# Patient Record
Sex: FEMALE
[De-identification: names, ages, dates, MRNs, and addresses within clinical notes are randomized per-mention and may not be internally consistent; named-entity substitution may affect disease eponyms.]

---

## 2017-01-11 ENCOUNTER — HOSPITAL (OUTPATIENT)
Dept: OTHER | Age: 67
End: 2017-01-11
Attending: INTERNAL MEDICINE

## 2017-01-23 ENCOUNTER — CHARTING TRANS (OUTPATIENT)
Dept: OTHER | Age: 67
End: 2017-01-23

## 2017-02-16 PROCEDURE — 82784 ASSAY IGA/IGD/IGG/IGM EACH: CPT | Performed by: INTERNAL MEDICINE

## 2017-02-16 PROCEDURE — 82607 VITAMIN B-12: CPT | Performed by: INTERNAL MEDICINE

## 2017-02-20 PROCEDURE — 87086 URINE CULTURE/COLONY COUNT: CPT | Performed by: INTERNAL MEDICINE

## 2017-02-20 PROCEDURE — 81003 URINALYSIS AUTO W/O SCOPE: CPT | Performed by: INTERNAL MEDICINE

## 2017-06-01 PROBLEM — M25.512 CHRONIC LEFT SHOULDER PAIN: Status: ACTIVE | Noted: 2017-06-01

## 2017-06-01 PROBLEM — G89.29 CHRONIC LEFT SHOULDER PAIN: Status: ACTIVE | Noted: 2017-06-01

## 2017-09-10 PROBLEM — E03.9 HYPOTHYROIDISM, UNSPECIFIED TYPE: Status: ACTIVE | Noted: 2017-09-10

## 2017-09-13 PROCEDURE — 87081 CULTURE SCREEN ONLY: CPT | Performed by: INTERNAL MEDICINE

## 2017-09-25 PROCEDURE — 88305 TISSUE EXAM BY PATHOLOGIST: CPT | Performed by: INTERNAL MEDICINE

## 2017-11-18 PROCEDURE — 86803 HEPATITIS C AB TEST: CPT | Performed by: INTERNAL MEDICINE

## 2018-01-04 PROBLEM — D80.1 HYPOGAMMAGLOBULINEMIA (HCC): Status: ACTIVE | Noted: 2018-01-04

## 2018-01-08 ENCOUNTER — APPOINTMENT (OUTPATIENT)
Dept: LAB | Age: 68
End: 2018-01-08
Attending: INTERNAL MEDICINE
Payer: MEDICARE

## 2018-01-08 ENCOUNTER — HOSPITAL ENCOUNTER (OUTPATIENT)
Dept: GENERAL RADIOLOGY | Age: 68
Discharge: HOME | End: 2018-01-08
Attending: INTERNAL MEDICINE
Payer: MEDICARE

## 2018-01-08 ENCOUNTER — LAB ENCOUNTER (OUTPATIENT)
Dept: LAB | Age: 68
End: 2018-01-08
Attending: INTERNAL MEDICINE
Payer: MEDICARE

## 2018-01-08 DIAGNOSIS — Z95.1 HX OF CABG: ICD-10-CM

## 2018-01-08 DIAGNOSIS — I25.10 CORONARY ARTERY DISEASE: ICD-10-CM

## 2018-01-08 LAB
BASOPHILS # BLD AUTO: 0.01 X10(3) UL (ref 0–0.1)
BASOPHILS NFR BLD AUTO: 0.2 %
BUN BLD-MCNC: 15 MG/DL (ref 8–20)
CALCIUM BLD-MCNC: 8.9 MG/DL (ref 8.3–10.3)
CHLORIDE: 104 MMOL/L (ref 101–111)
CO2: 29 MMOL/L (ref 22–32)
CREAT BLD-MCNC: 1.16 MG/DL (ref 0.55–1.02)
EOSINOPHIL # BLD AUTO: 0.13 X10(3) UL (ref 0–0.3)
EOSINOPHIL NFR BLD AUTO: 2.6 %
ERYTHROCYTE [DISTWIDTH] IN BLOOD BY AUTOMATED COUNT: 13.4 % (ref 11.5–16)
GLUCOSE BLD-MCNC: 80 MG/DL (ref 70–99)
HCT VFR BLD AUTO: 42 % (ref 34–50)
HGB BLD-MCNC: 13.5 G/DL (ref 12–16)
IMMATURE GRANULOCYTE COUNT: 0.02 X10(3) UL (ref 0–1)
IMMATURE GRANULOCYTE RATIO %: 0.4 %
LYMPHOCYTES # BLD AUTO: 1.39 X10(3) UL (ref 0.9–4)
LYMPHOCYTES NFR BLD AUTO: 27.3 %
MCH RBC QN AUTO: 28.5 PG (ref 27–33.2)
MCHC RBC AUTO-ENTMCNC: 32.1 G/DL (ref 31–37)
MCV RBC AUTO: 88.8 FL (ref 81–100)
MONOCYTES # BLD AUTO: 0.47 X10(3) UL (ref 0.1–0.6)
MONOCYTES NFR BLD AUTO: 9.2 %
NEUTROPHIL ABS PRELIM: 3.07 X10 (3) UL (ref 1.3–6.7)
NEUTROPHILS # BLD AUTO: 3.07 X10(3) UL (ref 1.3–6.7)
NEUTROPHILS NFR BLD AUTO: 60.3 %
PLATELET # BLD AUTO: 186 10(3)UL (ref 150–450)
POTASSIUM SERPL-SCNC: 3.3 MMOL/L (ref 3.6–5.1)
RBC # BLD AUTO: 4.73 X10(6)UL (ref 3.8–5.1)
RED CELL DISTRIBUTION WIDTH-SD: 43.8 FL (ref 35.1–46.3)
SODIUM SERPL-SCNC: 140 MMOL/L (ref 136–144)
WBC # BLD AUTO: 5.1 X10(3) UL (ref 4–13)

## 2018-01-08 PROCEDURE — 71046 X-RAY EXAM CHEST 2 VIEWS: CPT | Performed by: INTERNAL MEDICINE

## 2018-01-08 PROCEDURE — 93010 ELECTROCARDIOGRAM REPORT: CPT | Performed by: INTERNAL MEDICINE

## 2018-01-08 PROCEDURE — 36415 COLL VENOUS BLD VENIPUNCTURE: CPT

## 2018-01-08 PROCEDURE — 85025 COMPLETE CBC W/AUTO DIFF WBC: CPT

## 2018-01-08 PROCEDURE — 80048 BASIC METABOLIC PNL TOTAL CA: CPT

## 2018-01-08 PROCEDURE — 93005 ELECTROCARDIOGRAM TRACING: CPT

## 2018-01-08 RX ORDER — PAROXETINE HYDROCHLORIDE 20 MG/1
20 TABLET, FILM COATED ORAL EVERY MORNING
COMMUNITY
End: 2018-07-09

## 2018-01-08 RX ORDER — MULTIVITAMIN
1 TABLET ORAL DAILY
COMMUNITY

## 2018-01-08 RX ORDER — GUAIFENESIN 600 MG
600 TABLET, EXTENDED RELEASE 12 HR ORAL 2 TIMES DAILY PRN
COMMUNITY
End: 2020-01-20

## 2018-01-09 LAB
ATRIAL RATE: 60 BPM
P AXIS: 33 DEGREES
P-R INTERVAL: 126 MS
Q-T INTERVAL: 406 MS
QRS DURATION: 78 MS
QTC CALCULATION (BEZET): 406 MS
R AXIS: 20 DEGREES
T AXIS: 68 DEGREES
VENTRICULAR RATE: 60 BPM

## 2018-01-11 ENCOUNTER — HOSPITAL ENCOUNTER (OUTPATIENT)
Dept: INTERVENTIONAL RADIOLOGY/VASCULAR | Facility: HOSPITAL | Age: 68
Discharge: HOME | End: 2018-01-11
Attending: INTERNAL MEDICINE | Admitting: INTERNAL MEDICINE
Payer: MEDICARE

## 2018-01-11 VITALS
HEIGHT: 66 IN | WEIGHT: 190 LBS | DIASTOLIC BLOOD PRESSURE: 51 MMHG | RESPIRATION RATE: 12 BRPM | BODY MASS INDEX: 30.53 KG/M2 | HEART RATE: 55 BPM | SYSTOLIC BLOOD PRESSURE: 119 MMHG | OXYGEN SATURATION: 97 %

## 2018-01-11 DIAGNOSIS — I25.10 CAD (CORONARY ARTERY DISEASE): ICD-10-CM

## 2018-01-11 PROCEDURE — B210YZZ FLUOROSCOPY OF SINGLE CORONARY ARTERY USING OTHER CONTRAST: ICD-10-PCS | Performed by: INTERNAL MEDICINE

## 2018-01-11 PROCEDURE — 93459 L HRT ART/GRFT ANGIO: CPT

## 2018-01-11 PROCEDURE — 99153 MOD SED SAME PHYS/QHP EA: CPT

## 2018-01-11 PROCEDURE — 4A023N7 MEASUREMENT OF CARDIAC SAMPLING AND PRESSURE, LEFT HEART, PERCUTANEOUS APPROACH: ICD-10-PCS | Performed by: INTERNAL MEDICINE

## 2018-01-11 PROCEDURE — B215YZZ FLUOROSCOPY OF LEFT HEART USING OTHER CONTRAST: ICD-10-PCS | Performed by: INTERNAL MEDICINE

## 2018-01-11 PROCEDURE — B212YZZ FLUOROSCOPY OF SINGLE CORONARY ARTERY BYPASS GRAFT USING OTHER CONTRAST: ICD-10-PCS | Performed by: INTERNAL MEDICINE

## 2018-01-11 PROCEDURE — 99152 MOD SED SAME PHYS/QHP 5/>YRS: CPT

## 2018-01-11 RX ORDER — MIDAZOLAM HYDROCHLORIDE 1 MG/ML
INJECTION INTRAMUSCULAR; INTRAVENOUS
Status: COMPLETED
Start: 2018-01-11 | End: 2018-01-11

## 2018-01-11 RX ORDER — HEPARIN SODIUM 5000 [USP'U]/ML
INJECTION, SOLUTION INTRAVENOUS; SUBCUTANEOUS
Status: COMPLETED
Start: 2018-01-11 | End: 2018-01-11

## 2018-01-11 RX ORDER — LIDOCAINE HYDROCHLORIDE 10 MG/ML
INJECTION, SOLUTION EPIDURAL; INFILTRATION; INTRACAUDAL; PERINEURAL
Status: COMPLETED
Start: 2018-01-11 | End: 2018-01-11

## 2018-01-11 RX ORDER — SODIUM CHLORIDE 9 MG/ML
INJECTION, SOLUTION INTRAVENOUS CONTINUOUS
Status: ACTIVE | OUTPATIENT
Start: 2018-01-11 | End: 2018-01-11

## 2018-01-11 RX ORDER — ASPIRIN 81 MG/1
324 TABLET, CHEWABLE ORAL ONCE
Status: DISCONTINUED | OUTPATIENT
Start: 2018-01-11 | End: 2018-01-11

## 2018-01-11 RX ORDER — LABETALOL HYDROCHLORIDE 5 MG/ML
INJECTION, SOLUTION INTRAVENOUS
Status: COMPLETED
Start: 2018-01-11 | End: 2018-01-11

## 2018-01-11 RX ORDER — SODIUM CHLORIDE 9 MG/ML
INJECTION, SOLUTION INTRAVENOUS CONTINUOUS
Status: DISCONTINUED | OUTPATIENT
Start: 2018-01-11 | End: 2018-01-11

## 2018-01-11 RX ADMIN — SODIUM CHLORIDE: 9 INJECTION, SOLUTION INTRAVENOUS at 07:44:00

## 2018-01-11 NOTE — PROCEDURES
[address] [place] [address] Location: Cath Lab    CSN [id number] MRN [id number]   Admission Date 1/11/2018 Procedure Date 1/11/2018   Attending Physician Yonis Godfrey MD Procedure Physician Ar[place]an [place] [id number] motion abnormalities. There was no aortic stenosis upon pullback of the catheter.     2.  Selective coronary angiography:      Left main artery: The left main artery is a small caliber vessel that bifurcates into the LAD and LCx arteries; no significant an

## 2018-01-11 NOTE — PROGRESS NOTES
Pt ambulated in gale, rt groin remains c/d/i and soft. Discharge instructions reviewed w pt and spouse. no questions. IV conner.  Pt discharged to home in stable condition

## 2018-01-11 NOTE — H&P
Patient seen and examined independently. H and P reviewed. No changes in H and P. Risks and benefits of procedure were discussed with patient. Airway examined. Patient is ASA class 2 and mallampati class 2. Pt is appropriate for conscious sedation.  No his

## 2018-02-09 ENCOUNTER — APPOINTMENT (OUTPATIENT)
Dept: GENERAL RADIOLOGY | Facility: HOSPITAL | Age: 68
End: 2018-02-09
Attending: EMERGENCY MEDICINE
Payer: MEDICARE

## 2018-02-09 ENCOUNTER — APPOINTMENT (OUTPATIENT)
Dept: MRI IMAGING | Facility: HOSPITAL | Age: 68
End: 2018-02-09
Attending: EMERGENCY MEDICINE
Payer: MEDICARE

## 2018-02-09 ENCOUNTER — HOSPITAL ENCOUNTER (EMERGENCY)
Facility: HOSPITAL | Age: 68
Discharge: HOME | End: 2018-02-09
Attending: EMERGENCY MEDICINE
Payer: MEDICARE

## 2018-02-09 VITALS
HEIGHT: 66 IN | RESPIRATION RATE: 11 BRPM | BODY MASS INDEX: 30.53 KG/M2 | DIASTOLIC BLOOD PRESSURE: 70 MMHG | SYSTOLIC BLOOD PRESSURE: 146 MMHG | WEIGHT: 190 LBS | HEART RATE: 53 BPM | OXYGEN SATURATION: 98 % | TEMPERATURE: 98 F

## 2018-02-09 DIAGNOSIS — R20.0 NUMBNESS AND TINGLING: Primary | ICD-10-CM

## 2018-02-09 DIAGNOSIS — R20.2 NUMBNESS AND TINGLING: Primary | ICD-10-CM

## 2018-02-09 LAB
ALBUMIN SERPL-MCNC: 4.2 G/DL (ref 3.5–4.8)
ALP LIVER SERPL-CCNC: 90 U/L (ref 55–142)
ALT SERPL-CCNC: 34 U/L (ref 14–54)
AST SERPL-CCNC: 23 U/L (ref 15–41)
ATRIAL RATE: 55 BPM
BASOPHILS # BLD AUTO: 0.01 X10(3) UL (ref 0–0.1)
BASOPHILS NFR BLD AUTO: 0.2 %
BILIRUB SERPL-MCNC: 0.9 MG/DL (ref 0.1–2)
BUN BLD-MCNC: 15 MG/DL (ref 8–20)
CALCIUM BLD-MCNC: 9.3 MG/DL (ref 8.3–10.3)
CHLORIDE: 108 MMOL/L (ref 101–111)
CO2: 27 MMOL/L (ref 22–32)
CREAT BLD-MCNC: 0.85 MG/DL (ref 0.55–1.02)
EOSINOPHIL # BLD AUTO: 0.15 X10(3) UL (ref 0–0.3)
EOSINOPHIL NFR BLD AUTO: 2.9 %
ERYTHROCYTE [DISTWIDTH] IN BLOOD BY AUTOMATED COUNT: 13.7 % (ref 11.5–16)
GLUCOSE BLD-MCNC: 99 MG/DL (ref 70–99)
HCT VFR BLD AUTO: 44.4 % (ref 34–50)
HGB BLD-MCNC: 14.2 G/DL (ref 12–16)
IMMATURE GRANULOCYTE COUNT: 0.01 X10(3) UL (ref 0–1)
IMMATURE GRANULOCYTE RATIO %: 0.2 %
LYMPHOCYTES # BLD AUTO: 1.16 X10(3) UL (ref 0.9–4)
LYMPHOCYTES NFR BLD AUTO: 22.2 %
M PROTEIN MFR SERPL ELPH: 7.6 G/DL (ref 6.1–8.3)
MCH RBC QN AUTO: 28.1 PG (ref 27–33.2)
MCHC RBC AUTO-ENTMCNC: 32 G/DL (ref 31–37)
MCV RBC AUTO: 87.7 FL (ref 81–100)
MONOCYTES # BLD AUTO: 0.44 X10(3) UL (ref 0.1–1)
MONOCYTES NFR BLD AUTO: 8.4 %
NEUTROPHIL ABS PRELIM: 3.45 X10 (3) UL (ref 1.3–6.7)
NEUTROPHILS # BLD AUTO: 3.45 X10(3) UL (ref 1.3–6.7)
NEUTROPHILS NFR BLD AUTO: 66.1 %
P AXIS: 58 DEGREES
P-R INTERVAL: 150 MS
PLATELET # BLD AUTO: 163 10(3)UL (ref 150–450)
POTASSIUM SERPL-SCNC: 3.6 MMOL/L (ref 3.6–5.1)
Q-T INTERVAL: 426 MS
QRS DURATION: 90 MS
QTC CALCULATION (BEZET): 407 MS
R AXIS: 20 DEGREES
RBC # BLD AUTO: 5.06 X10(6)UL (ref 3.8–5.1)
RED CELL DISTRIBUTION WIDTH-SD: 44.1 FL (ref 35.1–46.3)
SODIUM SERPL-SCNC: 141 MMOL/L (ref 136–144)
T AXIS: 68 DEGREES
TROPONIN: <0.046 NG/ML (ref ?–0.05)
VENTRICULAR RATE: 55 BPM
WBC # BLD AUTO: 5.2 X10(3) UL (ref 4–13)

## 2018-02-09 PROCEDURE — 93005 ELECTROCARDIOGRAM TRACING: CPT

## 2018-02-09 PROCEDURE — 80053 COMPREHEN METABOLIC PANEL: CPT | Performed by: EMERGENCY MEDICINE

## 2018-02-09 PROCEDURE — 93010 ELECTROCARDIOGRAM REPORT: CPT

## 2018-02-09 PROCEDURE — 85025 COMPLETE CBC W/AUTO DIFF WBC: CPT | Performed by: EMERGENCY MEDICINE

## 2018-02-09 PROCEDURE — 36415 COLL VENOUS BLD VENIPUNCTURE: CPT

## 2018-02-09 PROCEDURE — 71045 X-RAY EXAM CHEST 1 VIEW: CPT | Performed by: EMERGENCY MEDICINE

## 2018-02-09 PROCEDURE — 99285 EMERGENCY DEPT VISIT HI MDM: CPT

## 2018-02-09 PROCEDURE — 70553 MRI BRAIN STEM W/O & W/DYE: CPT | Performed by: EMERGENCY MEDICINE

## 2018-02-09 PROCEDURE — A9575 INJ GADOTERATE MEGLUMI 0.1ML: HCPCS | Performed by: EMERGENCY MEDICINE

## 2018-02-09 PROCEDURE — 84484 ASSAY OF TROPONIN QUANT: CPT | Performed by: EMERGENCY MEDICINE

## 2018-02-09 RX ORDER — CLONIDINE HYDROCHLORIDE 0.1 MG/1
0.1 TABLET ORAL 2 TIMES DAILY
Status: DISCONTINUED | OUTPATIENT
Start: 2018-02-09 | End: 2018-02-09

## 2018-02-09 NOTE — ED PROVIDER NOTES
Patient Seen in: [facility] Emergency Department    History   Patient presents with:  Numbness Weakness (neurologic)    Stated Complaint: tingling in hand x1 hour    HPI    60-year-old female who presented to the emergency department complaining of 55%       Past Surgical History:  No date: ANGIOGRAM  12/2014 5 V CABG: CABG      Comment: Chris  11/05- recheck in 8-10 yrs Dr. Bronson Mares: COLONOSCOPY      Comment: hyperplastic polyps, diverticulosis, int                hemorrhoids  9/2012: COLONOSCOPY x1 hour  Other systems are as noted in HPI. Constitutional and vital signs reviewed. All other systems reviewed and negative except as noted above.     Physical Exam   ED Triage Vitals [02/09/18 1150]  BP: (!) 191/95  Pulse: 64  Resp: 16  Temp: 98.1 ° -----------         ------                     CBC W/ DIFFERENTIAL[[id number]]                              Final result                 Please view results for these tests on the individual orders.    [address]   R months suggested to assess for stability. Minute chronic microvascular ischemic changes.   X-ray essentially stable appearance of the chest.  EKG was normal.  CBC metabolic panel and troponin were normal.  Her initial blood pressure was 191/95 but it came

## 2018-02-09 NOTE — ED INITIAL ASSESSMENT (HPI)
Pt here for tingling in left hand for past hour. Pt has cardiac hx with angiogram done in Jan. Pt has arthritis/ pain in upper left arm since June and had PT. Pt denies n,v,d. Pt denies light headedness.

## 2018-02-18 PROBLEM — R90.89 ABNORMAL FINDING ON MRI OF BRAIN: Status: ACTIVE | Noted: 2018-02-18

## 2018-04-04 PROCEDURE — 82784 ASSAY IGA/IGD/IGG/IGM EACH: CPT | Performed by: INTERNAL MEDICINE

## 2018-04-04 PROCEDURE — 81003 URINALYSIS AUTO W/O SCOPE: CPT | Performed by: INTERNAL MEDICINE

## 2018-05-31 PROCEDURE — 87480 CANDIDA DNA DIR PROBE: CPT | Performed by: OBSTETRICS & GYNECOLOGY

## 2018-05-31 PROCEDURE — 87660 TRICHOMONAS VAGIN DIR PROBE: CPT | Performed by: OBSTETRICS & GYNECOLOGY

## 2018-05-31 PROCEDURE — 87510 GARDNER VAG DNA DIR PROBE: CPT | Performed by: OBSTETRICS & GYNECOLOGY

## 2018-10-25 PROCEDURE — 82785 ASSAY OF IGE: CPT | Performed by: INTERNAL MEDICINE

## 2018-10-25 PROCEDURE — 82784 ASSAY IGA/IGD/IGG/IGM EACH: CPT | Performed by: INTERNAL MEDICINE

## 2020-10-20 PROBLEM — M18.11 ARTHRITIS OF CARPOMETACARPAL (CMC) JOINT OF RIGHT THUMB: Status: ACTIVE | Noted: 2020-10-20

## 2021-01-05 ENCOUNTER — IMMUNIZATION (OUTPATIENT)
Dept: LAB | Facility: HOSPITAL | Age: 71
End: 2021-01-05
Attending: PREVENTIVE MEDICINE
Payer: MEDICARE

## 2021-01-05 DIAGNOSIS — Z23 NEED FOR VACCINATION: ICD-10-CM

## 2021-01-05 PROCEDURE — 0011A SARSCOV2 VAC 100MCG/0.5ML IM: CPT

## 2021-02-02 ENCOUNTER — IMMUNIZATION (OUTPATIENT)
Dept: LAB | Facility: HOSPITAL | Age: 71
End: 2021-02-02
Attending: PREVENTIVE MEDICINE
Payer: MEDICARE

## 2021-02-02 DIAGNOSIS — Z23 NEED FOR VACCINATION: Primary | ICD-10-CM

## 2021-02-02 PROCEDURE — 0012A SARSCOV2 VAC 100MCG/0.5ML IM: CPT

## 2021-07-27 RX ORDER — AZELASTINE HYDROCHLORIDE 137 UG/1
SPRAY, METERED NASAL DAILY
COMMUNITY
End: 2021-10-25

## 2021-08-03 ENCOUNTER — HOSPITAL ENCOUNTER (OUTPATIENT)
Facility: HOSPITAL | Age: 71
Setting detail: HOSPITAL OUTPATIENT SURGERY
Discharge: HOME | End: 2021-08-03
Attending: INTERNAL MEDICINE | Admitting: INTERNAL MEDICINE
Payer: MEDICARE

## 2021-08-03 ENCOUNTER — ANESTHESIA (OUTPATIENT)
Dept: ENDOSCOPY | Facility: HOSPITAL | Age: 71
End: 2021-08-03
Payer: MEDICARE

## 2021-08-03 ENCOUNTER — ANESTHESIA EVENT (OUTPATIENT)
Dept: ENDOSCOPY | Facility: HOSPITAL | Age: 71
End: 2021-08-03
Payer: MEDICARE

## 2021-08-03 VITALS
OXYGEN SATURATION: 100 % | HEIGHT: 66.5 IN | BODY MASS INDEX: 30.49 KG/M2 | SYSTOLIC BLOOD PRESSURE: 124 MMHG | RESPIRATION RATE: 18 BRPM | WEIGHT: 192 LBS | HEART RATE: 54 BPM | TEMPERATURE: 98 F | DIASTOLIC BLOOD PRESSURE: 66 MMHG

## 2021-08-03 DIAGNOSIS — K31.89 GASTRIC NODULE: ICD-10-CM

## 2021-08-03 PROCEDURE — 0DB38ZX EXCISION OF LOWER ESOPHAGUS, VIA NATURAL OR ARTIFICIAL OPENING ENDOSCOPIC, DIAGNOSTIC: ICD-10-PCS | Performed by: INTERNAL MEDICINE

## 2021-08-03 PROCEDURE — 88305 TISSUE EXAM BY PATHOLOGIST: CPT | Performed by: INTERNAL MEDICINE

## 2021-08-03 RX ORDER — SODIUM CHLORIDE, SODIUM LACTATE, POTASSIUM CHLORIDE, CALCIUM CHLORIDE 600; 310; 30; 20 MG/100ML; MG/100ML; MG/100ML; MG/100ML
INJECTION, SOLUTION INTRAVENOUS CONTINUOUS
Status: DISCONTINUED | OUTPATIENT
Start: 2021-08-03 | End: 2021-08-03

## 2021-08-03 RX ORDER — LIDOCAINE HYDROCHLORIDE 10 MG/ML
INJECTION, SOLUTION EPIDURAL; INFILTRATION; INTRACAUDAL; PERINEURAL AS NEEDED
Status: DISCONTINUED | OUTPATIENT
Start: 2021-08-03 | End: 2021-08-03 | Stop reason: SURG

## 2021-08-03 RX ORDER — ONDANSETRON 2 MG/ML
4 INJECTION INTRAMUSCULAR; INTRAVENOUS AS NEEDED
Status: DISCONTINUED | OUTPATIENT
Start: 2021-08-03 | End: 2021-08-03

## 2021-08-03 RX ORDER — METOCLOPRAMIDE HYDROCHLORIDE 5 MG/ML
10 INJECTION INTRAMUSCULAR; INTRAVENOUS AS NEEDED
Status: DISCONTINUED | OUTPATIENT
Start: 2021-08-03 | End: 2021-08-03

## 2021-08-03 RX ORDER — NALOXONE HYDROCHLORIDE 0.4 MG/ML
80 INJECTION, SOLUTION INTRAMUSCULAR; INTRAVENOUS; SUBCUTANEOUS AS NEEDED
Status: DISCONTINUED | OUTPATIENT
Start: 2021-08-03 | End: 2021-08-03

## 2021-08-03 RX ADMIN — SODIUM CHLORIDE, SODIUM LACTATE, POTASSIUM CHLORIDE, CALCIUM CHLORIDE: 600; 310; 30; 20 INJECTION, SOLUTION INTRAVENOUS at 09:44:00

## 2021-08-03 RX ADMIN — SODIUM CHLORIDE, SODIUM LACTATE, POTASSIUM CHLORIDE, CALCIUM CHLORIDE: 600; 310; 30; 20 INJECTION, SOLUTION INTRAVENOUS at 09:27:00

## 2021-08-03 RX ADMIN — LIDOCAINE HYDROCHLORIDE 80 MG: 10 INJECTION, SOLUTION EPIDURAL; INFILTRATION; INTRACAUDAL; PERINEURAL at 09:35:00

## 2021-08-03 NOTE — ANESTHESIA PREPROCEDURE EVALUATION
PRE-OP EVALUATION    Patient Name: Delfina Waite    Admit Diagnosis: Gastric nodule [K31.89]    Pre-op Diagnosis: Gastric nodule [K31.89]    ESOPHAGOGASTRODUODENOSCOPY  WITH ENDOSCOPIC MUCOSAL RESECTION    Anesthesia Procedure: Fei Smallwood (two) times daily. , Disp: 90 tablet, Rfl: 3, 8/3/2021 at Unknown time  Sodium Chloride-Xylitol (XLEAR SINUS CARE SPRAY NA), by Nasal route 2 (two) times daily. , Disp: , Rfl: , 8/2/2021 at Unknown time  Albuterol Sulfate HFA (PROAIR HFA) 108 (90 Base) MCG/A diverticulosis   • COLONOSCOPY,BIOPSY  9/11/2012    Procedure: ESOPHAGOGASTRODUODENOSCOPY, COLONOSCOPY, POSSIBLE BIOPSY, POSSIBLE POLYPECTOMY 47356,[id number];  Surgeon: Leila Ortiz MD;  Location: [address] [place] [address]   • D & C     • EGD N/A 9/25/201 3   Plan: MAC  NPO status verified and patient meets guidelines. Comment: Plan for MAC with GA back up. Risks include but limited to awareness, oral and dental injury, nausea/vomiting, anaphylaxis, prolonged ventilation and myocardial infarction.  Al

## 2021-08-03 NOTE — H&P
History & Physical Examination    Patient Name: Priscilla Piper  MRN: [id number]  CSN: [id number]  YOB: 1950    Diagnosis: Gastric nodule [K31.89]      Present Illness:  Priscilla Piper is a 69year old female is here Gastric nodul omnicef  Dander                  HIVES, Runny nose, ITCHING    Comment:Cats & dogs  Mold                    WHEEZING    Past Surgical History:   Procedure Laterality Date   • ANGIOGRAM     • CABG  12/2014 5 V CABG    [place]   • CARPAL TUNNEL RELEASE Rebecca fatal MI at 80   • Psychiatric Father         dementia   • Lipids Mother    • Heart Disease Mother         CVA at age 54   • Mental Disorder Mother         dementia   • Other (Other) Mother         stroke   • Heart Disorder Brother 58        stent/MI age 10

## 2021-08-03 NOTE — OPERATIVE REPORT
OPERATIVE REPORT   PATIENT NAME: Lyssa Rick  MRN: [id number]  DATE OF OPERATION: 8/3/2021  PREOPERATIVE DIAGNOSIS: gastric hyperplastic nodule  POSTOPERATIVE DIAGNOSIS:    1. Regressed gastric nodule after PPI    2. Small hiatal hermia   3.   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 Patient to resume Plavix tomorrow.     DISCHARGE:  On discharge, the patient was given an after-visit summary detailing the procedure, findings, followup plans, and an updated medication list.

## 2021-08-03 NOTE — ANESTHESIA POSTPROCEDURE EVALUATION
[address] Patient Status:  Hospital Outpatient Surgery   Age/Gender 69year old female MRN [id number]   Location [id number] [place] [address] Attending Celso Waters MD   Hosp Day # 0 PCP Alessandra Andrea MD

## 2021-10-11 ENCOUNTER — TELEPHONE (OUTPATIENT)
Dept: INTERNAL MEDICINE CLINIC | Facility: HOSPITAL | Age: 71
End: 2021-10-11

## 2021-10-11 NOTE — TELEPHONE ENCOUNTER
Outside Covid Testing done    Results and RTW guidelines:    COVID RESULT reported:      Test type:    [x] Rapid outside         [] PCR outside    Date of test: 10/2/21    Test location: [facility]     [x] Result viewed in Epic with verbal consent received from e loss of taste and smell. Has allergies to ragweed and experiencing Sx related. Went to [facility] per pt and quarantined at home with . Is calling to return to volunteering . Information given to call Employee [employer] for RTW clearance.     RTW PLAN:    [x] • Shortness of breath  Yes [x]      • Congestion                 Yes [x]      • Runny nose                Yes [x]        • Loss of Smell              Yes [x]       •  Loss of Taste             Yes [x]       • Sore throat                 Yes []       • Fa

## 2021-10-22 ENCOUNTER — APPOINTMENT (OUTPATIENT)
Dept: GENERAL RADIOLOGY | Facility: HOSPITAL | Age: 71
End: 2021-10-22
Attending: EMERGENCY MEDICINE
Payer: MEDICARE

## 2021-10-22 ENCOUNTER — APPOINTMENT (OUTPATIENT)
Dept: CT IMAGING | Facility: HOSPITAL | Age: 71
End: 2021-10-22
Attending: EMERGENCY MEDICINE
Payer: MEDICARE

## 2021-10-22 ENCOUNTER — HOSPITAL ENCOUNTER (EMERGENCY)
Facility: HOSPITAL | Age: 71
Discharge: HOME | End: 2021-10-23
Attending: EMERGENCY MEDICINE
Payer: MEDICARE

## 2021-10-22 VITALS
TEMPERATURE: 97 F | DIASTOLIC BLOOD PRESSURE: 70 MMHG | SYSTOLIC BLOOD PRESSURE: 139 MMHG | OXYGEN SATURATION: 98 % | HEART RATE: 50 BPM | RESPIRATION RATE: 12 BRPM

## 2021-10-22 DIAGNOSIS — J98.01 ACUTE BRONCHOSPASM: ICD-10-CM

## 2021-10-22 DIAGNOSIS — J20.9 ACUTE BRONCHITIS, UNSPECIFIED ORGANISM: Primary | ICD-10-CM

## 2021-10-22 PROCEDURE — 80053 COMPREHEN METABOLIC PANEL: CPT | Performed by: EMERGENCY MEDICINE

## 2021-10-22 PROCEDURE — 93010 ELECTROCARDIOGRAM REPORT: CPT

## 2021-10-22 PROCEDURE — 85025 COMPLETE CBC W/AUTO DIFF WBC: CPT | Performed by: EMERGENCY MEDICINE

## 2021-10-22 PROCEDURE — 99285 EMERGENCY DEPT VISIT HI MDM: CPT

## 2021-10-22 PROCEDURE — 85730 THROMBOPLASTIN TIME PARTIAL: CPT | Performed by: EMERGENCY MEDICINE

## 2021-10-22 PROCEDURE — 85379 FIBRIN DEGRADATION QUANT: CPT | Performed by: EMERGENCY MEDICINE

## 2021-10-22 PROCEDURE — 84484 ASSAY OF TROPONIN QUANT: CPT | Performed by: EMERGENCY MEDICINE

## 2021-10-22 PROCEDURE — 36415 COLL VENOUS BLD VENIPUNCTURE: CPT

## 2021-10-22 PROCEDURE — 85610 PROTHROMBIN TIME: CPT | Performed by: EMERGENCY MEDICINE

## 2021-10-22 PROCEDURE — 71045 X-RAY EXAM CHEST 1 VIEW: CPT | Performed by: EMERGENCY MEDICINE

## 2021-10-22 PROCEDURE — 99284 EMERGENCY DEPT VISIT MOD MDM: CPT

## 2021-10-22 PROCEDURE — 93005 ELECTROCARDIOGRAM TRACING: CPT

## 2021-10-22 PROCEDURE — 71275 CT ANGIOGRAPHY CHEST: CPT | Performed by: EMERGENCY MEDICINE

## 2021-10-23 NOTE — ED INITIAL ASSESSMENT (HPI)
COVID diagnosis 10/2. Reports for the last 48 hours when she breathes deeply she feels heaviness in her chest.  Sent to  by her pulmonologist for a CXR, which they couldn't do.  sent her here for imaging and bloodwork.

## 2021-10-23 NOTE — ED PROVIDER NOTES
Patient Seen in: [facility] Emergency Department      History   Patient presents with:  Chest Pain    Stated Complaint: chest pain from urgent care     Subjective:   HPI    65-year-old female presents to the emerge department for further evaluation Sleep apnea    • Unstable angina ([facility] [address] 07/21/2016    s/p DOTTY to SVG OM.  EF 55%   • Visual impairment     glasses              Past Surgical History:   Procedure Laterality Date   • ANGIOGRAM     • CABG  12/2014 5 V CABG    [place]   • CARPAL TUNNEL RELEAS are negative. Positive for stated complaint: chest pain from urgent care   Other systems are as noted in HPI. Constitutional and vital signs reviewed. All other systems reviewed and negative except as noted above.     Physical Exam     ED Triage - Abnormal; Notable for the following components:    D-Dimer 0.81 (*)     All other components within normal limits   TROPONIN I - Normal   PROTHROMBIN TIME (PT) - Normal   PTT, ACTIVATED - Normal   CBC WITH DIFFERENTIAL WITH PLATELET    Narrative:      The transmitted to the [facility] [employer] [facility]iology) Evon Reyna [address] ([address]) which includes the Dose Index Registry.   PATIENT STATED HISTORY:(As transcribed by Technologist)  chest pain from urgent care  recent COVID diagnosis   CONTRAST atelectasis or scarring seen at the left lung base which is stable. The lungs are hyperinflated. Old healed right rib fracture noted involving the posterolateral right 8th rib. CONCLUSION:  Status post CABG.   The caudal most sternotomy wire is bronchospasm     Disposition:  Discharge  10/22/2021 11:41 pm    Follow-up:  Dafne Hou MD  [facility]  [phone]    Schedule an appointment as soon as possible for a visit            Medications Prescribed

## 2021-11-12 PROBLEM — M25.512 CHRONIC LEFT SHOULDER PAIN: Status: RESOLVED | Noted: 2017-06-01 | Resolved: 2021-11-12

## 2021-11-12 PROBLEM — G89.29 CHRONIC LEFT SHOULDER PAIN: Status: RESOLVED | Noted: 2017-06-01 | Resolved: 2021-11-12

## 2021-11-12 PROBLEM — M18.11 ARTHRITIS OF CARPOMETACARPAL (CMC) JOINT OF RIGHT THUMB: Status: RESOLVED | Noted: 2020-10-20 | Resolved: 2021-11-12

## 2021-11-15 PROBLEM — D80.1 HYPOGAMMAGLOBULINEMIA (HCC): Status: RESOLVED | Noted: 2018-01-04 | Resolved: 2021-11-15

## 2021-11-18 ENCOUNTER — HOSPITAL ENCOUNTER (EMERGENCY)
Facility: HOSPITAL | Age: 71
Discharge: HOME | End: 2021-11-19
Attending: EMERGENCY MEDICINE
Payer: MEDICARE

## 2021-11-18 ENCOUNTER — APPOINTMENT (OUTPATIENT)
Dept: GENERAL RADIOLOGY | Facility: HOSPITAL | Age: 71
End: 2021-11-18
Attending: EMERGENCY MEDICINE
Payer: MEDICARE

## 2021-11-18 DIAGNOSIS — N39.0 URINARY TRACT INFECTION WITHOUT HEMATURIA, SITE UNSPECIFIED: Primary | ICD-10-CM

## 2021-11-18 DIAGNOSIS — I10 HYPERTENSION, UNSPECIFIED TYPE: ICD-10-CM

## 2021-11-18 PROCEDURE — 36415 COLL VENOUS BLD VENIPUNCTURE: CPT

## 2021-11-18 PROCEDURE — 93005 ELECTROCARDIOGRAM TRACING: CPT

## 2021-11-18 PROCEDURE — 71045 X-RAY EXAM CHEST 1 VIEW: CPT | Performed by: EMERGENCY MEDICINE

## 2021-11-18 PROCEDURE — 93010 ELECTROCARDIOGRAM REPORT: CPT

## 2021-11-18 PROCEDURE — 99284 EMERGENCY DEPT VISIT MOD MDM: CPT

## 2021-11-19 VITALS
DIASTOLIC BLOOD PRESSURE: 58 MMHG | WEIGHT: 191.81 LBS | HEART RATE: 55 BPM | OXYGEN SATURATION: 98 % | RESPIRATION RATE: 13 BRPM | TEMPERATURE: 98 F | SYSTOLIC BLOOD PRESSURE: 138 MMHG | BODY MASS INDEX: 30 KG/M2

## 2021-11-19 PROCEDURE — 87086 URINE CULTURE/COLONY COUNT: CPT | Performed by: EMERGENCY MEDICINE

## 2021-11-19 PROCEDURE — 81001 URINALYSIS AUTO W/SCOPE: CPT | Performed by: EMERGENCY MEDICINE

## 2021-11-19 PROCEDURE — 84484 ASSAY OF TROPONIN QUANT: CPT | Performed by: EMERGENCY MEDICINE

## 2021-11-19 PROCEDURE — 85379 FIBRIN DEGRADATION QUANT: CPT | Performed by: EMERGENCY MEDICINE

## 2021-11-19 PROCEDURE — 85025 COMPLETE CBC W/AUTO DIFF WBC: CPT | Performed by: EMERGENCY MEDICINE

## 2021-11-19 PROCEDURE — 80053 COMPREHEN METABOLIC PANEL: CPT | Performed by: EMERGENCY MEDICINE

## 2021-11-19 RX ORDER — CEPHALEXIN 500 MG/1
500 CAPSULE ORAL 3 TIMES DAILY
Qty: 15 CAPSULE | Refills: 0 | Status: SHIPPED | OUTPATIENT
Start: 2021-11-19 | End: 2021-11-24

## 2021-11-19 NOTE — ED INITIAL ASSESSMENT (HPI)
Pt arrives with c/o GENESIS since this evening. sats 98 on RA, speaking in full sentences. No obvious distress.

## 2021-11-19 NOTE — ED PROVIDER NOTES
Patient Seen in: [facility] Emergency Department      History   Patient presents with:  Hypertension    Stated Complaint: high BP.     Subjective:   HPI    Patient with a history of 5 vessel CABG GERD without esophagitis asthma IBS and unfortunately • Gastroesophageal reflux disease without esophagitis    • High blood pressure    • Hypogammaglobulinemia (HCC)    • Hypothyroidism, unspecified type    • IBS (irritable bowel syndrome)    • Mixed hyperlipidemia    • EMELYN on CPAP 2012   • Osteopenia Nose: Nose normal.   Eyes:      General: No scleral icterus. Right eye: No discharge. Left eye: No discharge. Conjunctiva/sclera: Conjunctivae normal.      Pupils: Pupils are equal, round, and reactive to light.    Neck:      Vascular: Normal   TROPONIN I - Normal   CBC WITH DIFFERENTIAL WITH PLATELET    Narrative: The following orders were created for panel order CBC With Differential With Platelet.   Procedure                               Abnormality         Status woman presents to the emergency department she states that she got nauseous and had upper abdominal discomfort after eating a meal #35 and then having blueberry pie on her anniversary.   She has not been eating or drinking much recently because she has rece

## 2021-12-23 NOTE — Clinical Note
Eileen - I saw Ariadne today with vulvar skin complaints. I've recommended vulvar clobetasol & vag estrogen. I will work to manage her sx. I appreciate the opportunity to participate in her care. Thanks, Hortensia   RRT called for persistent tachycardia  Pt seen and examined at bedside. ECG w/  Rapid afib to 140s and elevated BPs to 582I systolic. S/P LHC which showed no vessel occlusion. Pt is adequately anticoagulated on eliquis. Plan for 20mg dilt bolus, 500cc NS Bolus. Repeat electrolytes and replete as needed.     [address]

## 2022-01-06 ENCOUNTER — IMMUNIZATION (OUTPATIENT)
Dept: LAB | Facility: HOSPITAL | Age: 72
End: 2022-01-06
Attending: EMERGENCY MEDICINE
Payer: MEDICARE

## 2022-01-06 DIAGNOSIS — Z23 NEED FOR VACCINATION: Primary | ICD-10-CM

## 2022-01-06 PROCEDURE — 0064A SARSCOV2 VAC 50MCG/0.25ML IM: CPT

## 2022-05-24 ENCOUNTER — IMMUNIZATION (OUTPATIENT)
Dept: LAB | Age: 72
End: 2022-05-24
Attending: EMERGENCY MEDICINE
Payer: MEDICARE

## 2022-05-24 DIAGNOSIS — Z23 NEED FOR VACCINATION: Primary | ICD-10-CM

## 2022-05-24 PROCEDURE — 0064A SARSCOV2 VAC 50MCG/0.25ML IM: CPT

## 2023-04-25 RX ORDER — LEVOCETIRIZINE DIHYDROCHLORIDE 5 MG/1
5 TABLET, FILM COATED ORAL EVERY EVENING
COMMUNITY

## 2023-04-25 RX ORDER — ATORVASTATIN CALCIUM 40 MG/1
40 TABLET, FILM COATED ORAL NIGHTLY
COMMUNITY

## 2023-04-25 RX ORDER — FLUTICASONE PROPIONATE 50 MCG
1 SPRAY, SUSPENSION (ML) NASAL DAILY PRN
COMMUNITY

## 2023-04-25 RX ORDER — CHLORPHENIRAMINE MALEATE 4 MG/1
4 TABLET ORAL EVERY 4 HOURS PRN
COMMUNITY

## 2023-04-25 RX ORDER — LOSARTAN POTASSIUM 50 MG/1
50 TABLET ORAL DAILY
COMMUNITY

## 2023-04-25 RX ORDER — CLOPIDOGREL BISULFATE 75 MG/1
75 TABLET ORAL DAILY
Status: ON HOLD | COMMUNITY
End: 2023-05-11

## 2023-04-25 RX ORDER — GARLIC EXTRACT 500 MG
1 CAPSULE ORAL DAILY
COMMUNITY

## 2023-05-01 ENCOUNTER — LABORATORY ENCOUNTER (OUTPATIENT)
Dept: LAB | Facility: HOSPITAL | Age: 73
End: 2023-05-01
Attending: SURGERY
Payer: MEDICARE

## 2023-05-01 ENCOUNTER — EKG ENCOUNTER (OUTPATIENT)
Dept: LAB | Facility: HOSPITAL | Age: 73
End: 2023-05-01
Attending: SURGERY
Payer: MEDICARE

## 2023-05-01 DIAGNOSIS — K21.9 GASTROESOPHAGEAL REFLUX DISEASE WITHOUT ESOPHAGITIS: ICD-10-CM

## 2023-05-01 LAB
ANION GAP SERPL CALC-SCNC: 2 MMOL/L (ref 0–18)
ATRIAL RATE: 46 BPM
BUN BLD-MCNC: 14 MG/DL (ref 7–18)
CALCIUM BLD-MCNC: 9.2 MG/DL (ref 8.5–10.1)
CHLORIDE SERPL-SCNC: 110 MMOL/L (ref 98–112)
CO2 SERPL-SCNC: 28 MMOL/L (ref 21–32)
CREAT BLD-MCNC: 0.84 MG/DL
FASTING STATUS PATIENT QL REPORTED: YES
GFR SERPLBLD BASED ON 1.73 SQ M-ARVRAT: 73 ML/MIN/1.73M2 (ref 60–?)
GLUCOSE BLD-MCNC: 99 MG/DL (ref 70–99)
OSMOLALITY SERPL CALC.SUM OF ELEC: 291 MOSM/KG (ref 275–295)
P AXIS: 50 DEGREES
P-R INTERVAL: 164 MS
POTASSIUM SERPL-SCNC: 3.9 MMOL/L (ref 3.5–5.1)
Q-T INTERVAL: 460 MS
QRS DURATION: 88 MS
QTC CALCULATION (BEZET): 402 MS
R AXIS: 11 DEGREES
SODIUM SERPL-SCNC: 140 MMOL/L (ref 136–145)
T AXIS: 47 DEGREES
VENTRICULAR RATE: 46 BPM

## 2023-05-01 PROCEDURE — 80048 BASIC METABOLIC PNL TOTAL CA: CPT

## 2023-05-01 PROCEDURE — 36415 COLL VENOUS BLD VENIPUNCTURE: CPT

## 2023-05-01 PROCEDURE — 93010 ELECTROCARDIOGRAM REPORT: CPT | Performed by: INTERNAL MEDICINE

## 2023-05-01 PROCEDURE — 93005 ELECTROCARDIOGRAM TRACING: CPT

## 2023-05-10 ENCOUNTER — HOSPITAL ENCOUNTER (OUTPATIENT)
Facility: HOSPITAL | Age: 73
Discharge: HOME | End: 2023-05-11
Attending: SURGERY | Admitting: SURGERY
Payer: MEDICARE

## 2023-05-10 ENCOUNTER — ANESTHESIA (OUTPATIENT)
Dept: SURGERY | Facility: HOSPITAL | Age: 73
End: 2023-05-10
Payer: MEDICARE

## 2023-05-10 ENCOUNTER — ANESTHESIA EVENT (OUTPATIENT)
Dept: SURGERY | Facility: HOSPITAL | Age: 73
End: 2023-05-10
Payer: MEDICARE

## 2023-05-10 DIAGNOSIS — K21.9 GASTROESOPHAGEAL REFLUX DISEASE WITHOUT ESOPHAGITIS: Primary | ICD-10-CM

## 2023-05-10 PROCEDURE — 94660 CPAP INITIATION&MGMT: CPT

## 2023-05-10 PROCEDURE — 8E0W4CZ ROBOTIC ASSISTED PROCEDURE OF TRUNK REGION, PERCUTANEOUS ENDOSCOPIC APPROACH: ICD-10-PCS | Performed by: SURGERY

## 2023-05-10 PROCEDURE — 94799 UNLISTED PULMONARY SVC/PX: CPT

## 2023-05-10 PROCEDURE — 0BQT3ZZ REPAIR DIAPHRAGM, PERCUTANEOUS APPROACH: ICD-10-PCS | Performed by: SURGERY

## 2023-05-10 RX ORDER — DEXAMETHASONE SODIUM PHOSPHATE 4 MG/ML
VIAL (ML) INJECTION AS NEEDED
Status: DISCONTINUED | OUTPATIENT
Start: 2023-05-10 | End: 2023-05-10 | Stop reason: SURG

## 2023-05-10 RX ORDER — CLINDAMYCIN PHOSPHATE 900 MG/50ML
900 INJECTION INTRAVENOUS EVERY 8 HOURS
Status: COMPLETED | OUTPATIENT
Start: 2023-05-10 | End: 2023-05-11

## 2023-05-10 RX ORDER — IPRATROPIUM BROMIDE 42 UG/1
1-2 SPRAY, METERED NASAL DAILY PRN
COMMUNITY
Start: 2022-08-26

## 2023-05-10 RX ORDER — ACETAMINOPHEN 500 MG
1000 TABLET ORAL ONCE AS NEEDED
Status: DISCONTINUED | OUTPATIENT
Start: 2023-05-10 | End: 2023-05-10 | Stop reason: HOSPADM

## 2023-05-10 RX ORDER — EPHEDRINE SULFATE 50 MG/ML
INJECTION INTRAVENOUS AS NEEDED
Status: DISCONTINUED | OUTPATIENT
Start: 2023-05-10 | End: 2023-05-10 | Stop reason: SURG

## 2023-05-10 RX ORDER — KETOROLAC TROMETHAMINE 30 MG/ML
INJECTION, SOLUTION INTRAMUSCULAR; INTRAVENOUS AS NEEDED
Status: DISCONTINUED | OUTPATIENT
Start: 2023-05-10 | End: 2023-05-10 | Stop reason: SURG

## 2023-05-10 RX ORDER — ALBUTEROL SULFATE 90 UG/1
2 AEROSOL, METERED RESPIRATORY (INHALATION) EVERY 4 HOURS PRN
Status: DISCONTINUED | OUTPATIENT
Start: 2023-05-10 | End: 2023-05-11

## 2023-05-10 RX ORDER — SODIUM CHLORIDE, SODIUM LACTATE, POTASSIUM CHLORIDE, CALCIUM CHLORIDE 600; 310; 30; 20 MG/100ML; MG/100ML; MG/100ML; MG/100ML
INJECTION, SOLUTION INTRAVENOUS CONTINUOUS
Status: DISCONTINUED | OUTPATIENT
Start: 2023-05-10 | End: 2023-05-11

## 2023-05-10 RX ORDER — NALOXONE HYDROCHLORIDE 0.4 MG/ML
80 INJECTION, SOLUTION INTRAMUSCULAR; INTRAVENOUS; SUBCUTANEOUS AS NEEDED
Status: DISCONTINUED | OUTPATIENT
Start: 2023-05-10 | End: 2023-05-10 | Stop reason: HOSPADM

## 2023-05-10 RX ORDER — ACETAMINOPHEN 500 MG
1000 TABLET ORAL ONCE
Status: DISCONTINUED | OUTPATIENT
Start: 2023-05-10 | End: 2023-05-10 | Stop reason: HOSPADM

## 2023-05-10 RX ORDER — HYDROMORPHONE HYDROCHLORIDE 1 MG/ML
0.2 INJECTION, SOLUTION INTRAMUSCULAR; INTRAVENOUS; SUBCUTANEOUS EVERY 5 MIN PRN
Status: DISCONTINUED | OUTPATIENT
Start: 2023-05-10 | End: 2023-05-10 | Stop reason: HOSPADM

## 2023-05-10 RX ORDER — SENNOSIDES 8.6 MG
17.2 TABLET ORAL NIGHTLY PRN
Status: DISCONTINUED | OUTPATIENT
Start: 2023-05-10 | End: 2023-05-11

## 2023-05-10 RX ORDER — POLYETHYLENE GLYCOL 3350 17 G/17G
17 POWDER, FOR SOLUTION ORAL DAILY PRN
Status: DISCONTINUED | OUTPATIENT
Start: 2023-05-10 | End: 2023-05-11

## 2023-05-10 RX ORDER — ONDANSETRON 2 MG/ML
4 INJECTION INTRAMUSCULAR; INTRAVENOUS EVERY 6 HOURS PRN
Status: DISCONTINUED | OUTPATIENT
Start: 2023-05-10 | End: 2023-05-10 | Stop reason: HOSPADM

## 2023-05-10 RX ORDER — METOCLOPRAMIDE HYDROCHLORIDE 5 MG/ML
10 INJECTION INTRAMUSCULAR; INTRAVENOUS EVERY 8 HOURS PRN
Status: DISCONTINUED | OUTPATIENT
Start: 2023-05-10 | End: 2023-05-11

## 2023-05-10 RX ORDER — ENEMA 19; 7 G/133ML; G/133ML
1 ENEMA RECTAL ONCE AS NEEDED
Status: DISCONTINUED | OUTPATIENT
Start: 2023-05-10 | End: 2023-05-11

## 2023-05-10 RX ORDER — KETOROLAC TROMETHAMINE 15 MG/ML
15 INJECTION, SOLUTION INTRAMUSCULAR; INTRAVENOUS EVERY 6 HOURS
Status: DISCONTINUED | OUTPATIENT
Start: 2023-05-10 | End: 2023-05-11

## 2023-05-10 RX ORDER — HYDROMORPHONE HYDROCHLORIDE 1 MG/ML
INJECTION, SOLUTION INTRAMUSCULAR; INTRAVENOUS; SUBCUTANEOUS
Status: COMPLETED
Start: 2023-05-10 | End: 2023-05-10

## 2023-05-10 RX ORDER — LIDOCAINE HYDROCHLORIDE 10 MG/ML
INJECTION, SOLUTION INFILTRATION; PERINEURAL AS NEEDED
Status: DISCONTINUED | OUTPATIENT
Start: 2023-05-10 | End: 2023-05-10 | Stop reason: HOSPADM

## 2023-05-10 RX ORDER — DEXTROSE, SODIUM CHLORIDE, AND POTASSIUM CHLORIDE 5; .45; .15 G/100ML; G/100ML; G/100ML
INJECTION INTRAVENOUS
Status: COMPLETED
Start: 2023-05-10 | End: 2023-05-10

## 2023-05-10 RX ORDER — BUPIVACAINE HYDROCHLORIDE 5 MG/ML
INJECTION, SOLUTION EPIDURAL; INTRACAUDAL AS NEEDED
Status: DISCONTINUED | OUTPATIENT
Start: 2023-05-10 | End: 2023-05-10 | Stop reason: HOSPADM

## 2023-05-10 RX ORDER — TRAMADOL HYDROCHLORIDE 50 MG/1
50 TABLET ORAL EVERY 6 HOURS SCHEDULED
Status: DISCONTINUED | OUTPATIENT
Start: 2023-05-10 | End: 2023-05-11

## 2023-05-10 RX ORDER — CLINDAMYCIN PHOSPHATE 900 MG/50ML
900 INJECTION INTRAVENOUS ONCE
Status: COMPLETED | OUTPATIENT
Start: 2023-05-10 | End: 2023-05-10

## 2023-05-10 RX ORDER — ROCURONIUM BROMIDE 10 MG/ML
INJECTION, SOLUTION INTRAVENOUS AS NEEDED
Status: DISCONTINUED | OUTPATIENT
Start: 2023-05-10 | End: 2023-05-10 | Stop reason: SURG

## 2023-05-10 RX ORDER — PANTOPRAZOLE SODIUM 20 MG/1
20 TABLET, DELAYED RELEASE ORAL
Status: DISCONTINUED | OUTPATIENT
Start: 2023-05-11 | End: 2023-05-11

## 2023-05-10 RX ORDER — DEXTROSE, SODIUM CHLORIDE, AND POTASSIUM CHLORIDE 5; .45; .15 G/100ML; G/100ML; G/100ML
INJECTION INTRAVENOUS CONTINUOUS
Status: DISCONTINUED | OUTPATIENT
Start: 2023-05-10 | End: 2023-05-11

## 2023-05-10 RX ORDER — ONDANSETRON 2 MG/ML
4 INJECTION INTRAMUSCULAR; INTRAVENOUS EVERY 6 HOURS PRN
Status: DISCONTINUED | OUTPATIENT
Start: 2023-05-10 | End: 2023-05-11

## 2023-05-10 RX ORDER — HYDROCODONE BITARTRATE AND ACETAMINOPHEN 5; 325 MG/1; MG/1
1 TABLET ORAL ONCE AS NEEDED
Status: DISCONTINUED | OUTPATIENT
Start: 2023-05-10 | End: 2023-05-10 | Stop reason: HOSPADM

## 2023-05-10 RX ORDER — METOCLOPRAMIDE HYDROCHLORIDE 5 MG/ML
10 INJECTION INTRAMUSCULAR; INTRAVENOUS EVERY 8 HOURS PRN
Status: DISCONTINUED | OUTPATIENT
Start: 2023-05-10 | End: 2023-05-10 | Stop reason: HOSPADM

## 2023-05-10 RX ORDER — HYDROMORPHONE HYDROCHLORIDE 1 MG/ML
0.6 INJECTION, SOLUTION INTRAMUSCULAR; INTRAVENOUS; SUBCUTANEOUS EVERY 5 MIN PRN
Status: DISCONTINUED | OUTPATIENT
Start: 2023-05-10 | End: 2023-05-10 | Stop reason: HOSPADM

## 2023-05-10 RX ORDER — LEVOCETIRIZINE DIHYDROCHLORIDE 5 MG/1
5 TABLET, FILM COATED ORAL EVERY EVENING
Status: ON HOLD | COMMUNITY
End: 2023-05-10

## 2023-05-10 RX ORDER — LIDOCAINE HYDROCHLORIDE 10 MG/ML
INJECTION, SOLUTION EPIDURAL; INFILTRATION; INTRACAUDAL; PERINEURAL AS NEEDED
Status: DISCONTINUED | OUTPATIENT
Start: 2023-05-10 | End: 2023-05-10 | Stop reason: SURG

## 2023-05-10 RX ORDER — BISACODYL 10 MG
10 SUPPOSITORY, RECTAL RECTAL
Status: DISCONTINUED | OUTPATIENT
Start: 2023-05-10 | End: 2023-05-11

## 2023-05-10 RX ORDER — LEVOTHYROXINE SODIUM 0.07 MG/1
75 TABLET ORAL
Status: DISCONTINUED | OUTPATIENT
Start: 2023-05-11 | End: 2023-05-11

## 2023-05-10 RX ORDER — PAROXETINE HYDROCHLORIDE 20 MG/1
20 TABLET, FILM COATED ORAL DAILY
Status: DISCONTINUED | OUTPATIENT
Start: 2023-05-11 | End: 2023-05-11

## 2023-05-10 RX ORDER — SODIUM CHLORIDE, SODIUM LACTATE, POTASSIUM CHLORIDE, CALCIUM CHLORIDE 600; 310; 30; 20 MG/100ML; MG/100ML; MG/100ML; MG/100ML
INJECTION, SOLUTION INTRAVENOUS CONTINUOUS
Status: DISCONTINUED | OUTPATIENT
Start: 2023-05-10 | End: 2023-05-10 | Stop reason: HOSPADM

## 2023-05-10 RX ORDER — LOSARTAN POTASSIUM 50 MG/1
50 TABLET ORAL DAILY
Status: DISCONTINUED | OUTPATIENT
Start: 2023-05-11 | End: 2023-05-11

## 2023-05-10 RX ORDER — HYDROMORPHONE HYDROCHLORIDE 1 MG/ML
0.4 INJECTION, SOLUTION INTRAMUSCULAR; INTRAVENOUS; SUBCUTANEOUS EVERY 5 MIN PRN
Status: DISCONTINUED | OUTPATIENT
Start: 2023-05-10 | End: 2023-05-10 | Stop reason: HOSPADM

## 2023-05-10 RX ORDER — ATORVASTATIN CALCIUM 40 MG/1
40 TABLET, FILM COATED ORAL NIGHTLY
Status: DISCONTINUED | OUTPATIENT
Start: 2023-05-10 | End: 2023-05-11

## 2023-05-10 RX ORDER — HYDROCODONE BITARTRATE AND ACETAMINOPHEN 5; 325 MG/1; MG/1
2 TABLET ORAL ONCE AS NEEDED
Status: DISCONTINUED | OUTPATIENT
Start: 2023-05-10 | End: 2023-05-10 | Stop reason: HOSPADM

## 2023-05-10 RX ORDER — ONDANSETRON 2 MG/ML
INJECTION INTRAMUSCULAR; INTRAVENOUS AS NEEDED
Status: DISCONTINUED | OUTPATIENT
Start: 2023-05-10 | End: 2023-05-10 | Stop reason: SURG

## 2023-05-10 RX ADMIN — SODIUM CHLORIDE, SODIUM LACTATE, POTASSIUM CHLORIDE, CALCIUM CHLORIDE: 600; 310; 30; 20 INJECTION, SOLUTION INTRAVENOUS at 07:33:00

## 2023-05-10 RX ADMIN — ONDANSETRON 4 MG: 2 INJECTION INTRAMUSCULAR; INTRAVENOUS at 08:58:00

## 2023-05-10 RX ADMIN — DEXAMETHASONE SODIUM PHOSPHATE 8 MG: 4 MG/ML VIAL (ML) INJECTION at 07:42:00

## 2023-05-10 RX ADMIN — LIDOCAINE HYDROCHLORIDE 50 MG: 10 INJECTION, SOLUTION EPIDURAL; INFILTRATION; INTRACAUDAL; PERINEURAL at 07:36:00

## 2023-05-10 RX ADMIN — ROCURONIUM BROMIDE 20 MG: 10 INJECTION, SOLUTION INTRAVENOUS at 08:46:00

## 2023-05-10 RX ADMIN — SODIUM CHLORIDE, SODIUM LACTATE, POTASSIUM CHLORIDE, CALCIUM CHLORIDE: 600; 310; 30; 20 INJECTION, SOLUTION INTRAVENOUS at 09:15:00

## 2023-05-10 RX ADMIN — ROCURONIUM BROMIDE 50 MG: 10 INJECTION, SOLUTION INTRAVENOUS at 07:43:00

## 2023-05-10 RX ADMIN — KETOROLAC TROMETHAMINE 30 MG: 30 INJECTION, SOLUTION INTRAMUSCULAR; INTRAVENOUS at 08:58:00

## 2023-05-10 RX ADMIN — CLINDAMYCIN PHOSPHATE 900 MG: 900 INJECTION INTRAVENOUS at 07:45:00

## 2023-05-10 RX ADMIN — EPHEDRINE SULFATE 10 MG: 50 INJECTION INTRAVENOUS at 08:31:00

## 2023-05-10 NOTE — ANESTHESIA PROCEDURE NOTES
Airway  Date/Time: 5/10/2023 7:39 AM  Urgency: Elective      General Information and Staff    Patient location during procedure: OR  Anesthesiologist: Tho Herrera MD  Resident/CRNA: Florence Fair CRNA  Performed: CRNA   Performed by: Florence Fair CRNA  Authorized by: Tho Herrera MD      Indications and Patient Condition  Indications for airway management: anesthesia  Sedation level: deep  Preoxygenated: yes  Patient position: sniffing  Mask difficulty assessment: 1 - vent by mask    Final Airway Details  Final airway type: endotracheal airway      Successful airway: ETT  Cuffed: yes   Successful intubation technique: direct laryngoscopy  Facilitating devices/methods: intubating stylet, cricoid pressure and rapid sequence intubation  Endotracheal tube insertion site: oral  Blade: Kaila  Blade size: #3  ETT size (mm): 7.0    Cormack-Lehane Classification: grade IIA - partial view of glottis  Placement verified by: chest auscultation and capnometry   Measured from: teeth  ETT to teeth (cm): 21  Number of attempts at approach: 1    Additional Comments  Atraumatic intubation, dentition and lips as preop

## 2023-05-10 NOTE — PLAN OF CARE
NURSING ADMISSION NOTE      Patient admitted via  Bed @ 1018am  Oriented to room. Safety precautions initiated. Bed in low position. Call light in reach. Patient is resting in bed with  at bedside. Alert and oriented X 4. Pain medication given as scheduled. Denies pain but has minimal gas pain. Abdomen is soft and tender. 5 Lap sites with skin glue. Dry and intact. Lung sounds are clear. Clear liquid diet tolerating well. Call light in reach.

## 2023-05-10 NOTE — PROGRESS NOTES
** I agree with charting of Pooja Hou. 1050: Paged  to notify of new consult.  to see patient later.

## 2023-05-10 NOTE — OPERATIVE REPORT
[facility]                                                         Operative Note    An Bear Location: [facility] Perioperative   CSN [id number] MRN [id number]   Admission Date 5/10/2023 Procedure Date 5/10/2023   Attending Physician Aric Jung MD Procedure Physician Sarah Em MD     Pre-Operative Diagnosis: GERD     Post-Operative Diagnosis: GERD    Procedure Performed: ROBOTIC ASSISTED REPAIR OF HIATAL HERNIA NISSEN FUNDOPLICATION    Surgeon: Sarah Em MD     Assistant(s):  Surgical Assistant.: SCOTT Steven     The surgical Assistant was necessary for this procedure. The assistant was involved in patient positioning, instrument exchange, improving exposure optimizing visualization of patient's safety, and closure. The duties of the assistant allowed for reduced risk of the performance of this procedure and care of this patient         Anesthesia: General       Complications: none       Estimated Blood Loss: Blood Output: 5 mL (5/10/2023  8:52 AM)              Operative Summary: Patient was taken to the operating room placed in a supine position. They were prepped and draped in usual fashion after being placed under general anesthesia. A Veress needle was inserted above the umbilicus and the abdomen insufflated 15 mm of carbon dioxide. This was exchanged with a 8 mm trocar after incision was made. 4 additional 8 mm trochars were placed and a 5 mm trocar. Camera was inserted and the abdominal cavity was surveyed prior to additional trocar placement. Patient was placed in a steep Trendelenburg position. Snake retractor was placed through the 5 mm trocar and the liver was retracted open to the right to expose the GE junction. The excised da Viviana robot was brought into position and docked. Instrumentation was placed. Stomach was gently retracted downward exposing the anterior aspect of the abdominal wall ubaldo.   The hernia sac was incised in this location and dissection was bluntly carried down creating the space between the hernia sac and the anterior aspect of the distal esophagus. The sac dissection was extended laterally to the left and right abdominal wall raquel. Hernia sac was dissected free of the underlying surfaces of the stomach and esophageal body. This was followed by creating a window along the lesser curve coming through the gastrohepatic ligament to expose the right abdominal wall raquel. Hernia sac was dissected free of this raquel which allowed dissection into the posterior aspect of the gastroesophageal junction. Blunt dissection lifted away the distal esophagus from the aorta. The posterior vagus nerve was appreciated and kept out of harm's way as well as the anterior vagus nerve. This was followed by taking down the short gastrics along the greater curvature and carrying this dissection to the left side of the raquel. Posterior window was completed allowing for passage of the Penrose drain around the distal esophagus for retraction. Hernia sac was dissected completely away from the GE junction and stomach exposing the esophageal surface allowing dissection and mobilization of the distal esophagus to at least 3 cm outside of the chest.  Once this was completed the right and left raquel were clearly appreciated. Raquel was then reapproximated with interrupted 0 Ethibond sutures secured down over pledgets. This occurred both posterior and anterior to the esophagus to but up against the esophagus. Once this was accomplished a 54 bougie catheter was gently placed down into the stomach followed by bringing the fundus of the stomach posterior to the GE junction in preparation for a Nissen fundoplication. 3 0 Ethibond sutures were secured creating this floppy wrap over this bougie. The bougie was removed. At this time hemostasis achieved. Snake was removed. Trochars were removed and wounds were closed with absorbable suture. Sterile dressings were applied.   The patient was awoken taken recovery in satisfactory condition          An Wallace MD  5/10/2023  9:04 AM

## 2023-05-10 NOTE — ANESTHESIA POSTPROCEDURE EVALUATION
[address] Patient Status:  Outpatient in a Bed   Age/Gender 67year old female MRN [id number]   [facility] SURGERY Attending Amisha Calhoun MD   [facility] Day # 0 PCP Fabricio Disla MD       Anesthesia Post-op Note    ROBOTIC ASSISTED REPAIR OF HIATAL HERNIA NISSEN FUNDOPLICATION    Procedure Summary     Date: 05/10/23 Room / Location: [facility] OR 08 / [facility] OR    Anesthesia Start: 4495 Anesthesia Stop: 3835    Procedure: ROBOTIC ASSISTED REPAIR OF HIATAL HERNIA NISSEN FUNDOPLICATION (Abdomen) Diagnosis: (GERD)    Surgeons: Amisha Calhoun MD Anesthesiologist: Marta Russell MD    Anesthesia Type: general ASA Status: 3          Anesthesia Type: general    Vitals Value Taken Time   /66 05/10/23 0916   Temp 97.7 05/10/23 0916   Pulse 71 05/10/23 0915   Resp 19 05/10/23 0915   SpO2 98 % 05/10/23 0915   Vitals shown include unvalidated device data. Patient Location: PACU    Anesthesia Type: general    Airway Patency: patent    Postop Pain Control: adequate    Mental Status: mildly sedated but able to meaningfully participate in the post-anesthesia evaluation    Nausea/Vomiting: none    Cardiopulmonary/Hydration status: stable euvolemic    Complications: no apparent anesthesia related complications    Postop vital signs: stable    Dental Exam: Unchanged from Preop    Patient to be discharged from PACU when criteria met.

## 2023-05-10 NOTE — INTERVAL H&P NOTE
Pre-op Diagnosis: GERD    The above referenced H&P was reviewed by Rico Sandhu MD on 5/10/2023, the patient was examined and no significant changes have occurred in the patient's condition since the H&P was performed. I discussed with the patient and/or legal representative the potential benefits, risks and side effects of this procedure; the likelihood of the patient achieving goals; and potential problems that might occur during recuperation. I discussed reasonable alternatives to the procedure, including risks, benefits and side effects related to the alternatives and risks related to not receiving this procedure. We will proceed with procedure as planned.

## 2023-05-10 NOTE — PROGRESS NOTES
05/10/23 1425   Clinical Encounter Type   Visited With Patient   Routine Visit Introduction   Taxonomy   Intended Effects Establish rapport and connectedness   Methods Offer support   Interventions Acknowledge current situation;Regent; Share words of hope and inspiration      responded to consult request. Patient Rui Griffin in bed.  Pradeep Knight at bedside. Rui Griffin expressed gratitude for successful surgery and asked for prayer.  explained services available such as communion, rosary, and bible if needed. Rui Griffin declined at this time but welcomed prayer.  remains available for support. Spiritual Care support can be requested via an [facility] consult. FREDY Romo. Div  Extension:[phone]

## 2023-05-10 NOTE — PLAN OF CARE
NURSING DISCHARGE NOTE    Discharged Home via Ambulatory. Accompanied by Spouse  Belongings Taken by patient/family. Prescriptions were E prescribed to patients pharmacy. Patient refused wheelchair and elected to ambulate off floor.

## 2023-05-11 VITALS
HEART RATE: 43 BPM | RESPIRATION RATE: 18 BRPM | WEIGHT: 189 LBS | SYSTOLIC BLOOD PRESSURE: 159 MMHG | BODY MASS INDEX: 30.02 KG/M2 | OXYGEN SATURATION: 96 % | HEIGHT: 66.5 IN | TEMPERATURE: 98 F | DIASTOLIC BLOOD PRESSURE: 61 MMHG

## 2023-05-11 LAB
HCT VFR BLD AUTO: 38.2 %
HGB BLD-MCNC: 12.2 G/DL

## 2023-05-11 PROCEDURE — 85018 HEMOGLOBIN: CPT | Performed by: STUDENT IN AN ORGANIZED HEALTH CARE EDUCATION/TRAINING PROGRAM

## 2023-05-11 PROCEDURE — 85014 HEMATOCRIT: CPT | Performed by: STUDENT IN AN ORGANIZED HEALTH CARE EDUCATION/TRAINING PROGRAM

## 2023-05-11 RX ORDER — CLOPIDOGREL BISULFATE 75 MG/1
75 TABLET ORAL DAILY
Refills: 0 | Status: SHIPPED | COMMUNITY
Start: 2023-05-12

## 2023-05-11 RX ORDER — TRAMADOL HYDROCHLORIDE 50 MG/1
TABLET ORAL EVERY 4 HOURS PRN
Qty: 20 TABLET | Refills: 0 | Status: SHIPPED | OUTPATIENT
Start: 2023-05-11

## 2023-05-11 NOTE — PLAN OF CARE
Patient alert and oriented x4. VSS, On room air. Using Cpap at night. Denies nausea. Complains of gas pain. Report mild abdominal pain. Reports belching and passing flatus. Voiding. Up ab yvette. Plan of care discussed with patient. Call light within reach. Problem: Patient/Family Goals  Goal: Patient/Family Long Term Goal  Description: Patient's Long Term Goal: Discharge    Interventions:  - Advance pain medications from IV to PO  - Advance diet as tolerated . - See additional Care Plan goals for specific interventions  Outcome: Progressing  Goal: Patient/Family Short Term Goal  Description: Patient's Short Term Goal: Prepare to discharge     Interventions:   - Pain tolerated   -Advance diet as tolerated.    - See additional Care Plan goals for specific interventions  Outcome: Progressing     Problem: GASTROINTESTINAL - ADULT  Goal: Minimal or absence of nausea and vomiting  Description: INTERVENTIONS:  - Maintain adequate hydration with IV or PO as ordered and tolerated  - Nasogastric tube to low intermittent suction as ordered  - Evaluate effectiveness of ordered antiemetic medications  - Provide nonpharmacologic comfort measures as appropriate  - Advance diet as tolerated, if ordered  - Obtain nutritional consult as needed  - Evaluate fluid balance  Outcome: Progressing  Goal: Maintains or returns to baseline bowel function  Description: INTERVENTIONS:  - Assess bowel function  - Maintain adequate hydration with IV or PO as ordered and tolerated  - Evaluate effectiveness of GI medications  - Encourage mobilization and activity  - Obtain nutritional consult as needed  - Establish a toileting routine/schedule  - Consider collaborating with pharmacy to review patient's medication profile  Outcome: Progressing  Goal: Maintains adequate nutritional intake (undernourished)  Description: INTERVENTIONS:  - Monitor percentage of each meal consumed  - Identify factors contributing to decreased intake, treat as appropriate  - Assist with meals as needed  - Monitor I&O, WT and lab values  - Obtain nutritional consult as needed  - Optimize oral hygiene and moisture  - Encourage food from home; allow for food preferences  - Enhance eating environment  Outcome: Progressing  Goal: Achieves appropriate nutritional intake (bariatric)  Description: INTERVENTIONS:  - Monitor for over-consumption  - Identify factors contributing to increased intake, treat as appropriate  - Monitor I&O, WT and lab values  - Obtain nutritional consult as needed  - Evaluate psychosocial factors contributing to over-consumption  Outcome: Progressing

## 2023-05-11 NOTE — DISCHARGE INSTRUCTIONS
Home Care Instructions  LAPAROSCOPIC/ROBOTIC SURGERY      MEDICATIONS   You should anticipate moderate to severe pain for the first few days. Your surgeon has prescribed for you a pain medication. Take the pain  medication as prescribed. You may use ibuprofen( Motrin ) 600 mg 3 times a day with the tramadol or by itself if needed. If you experience severe nausea or vomiting stop the pain medication and call our office. DIET   Your diet should be as you tolerate. Eat light foods the first 24 hours. Do not drink alcohol while taking the pain medication    ACTIVITY   You should not drive for the first 3 to 5 days or if you are still requiring prescription pain medication. No lifting greater than 10 to 15 pounds for 3 weeks   Avoid strenuous activity such as running and physical workouts for 3 weeks. Normal daily activities are fine, such as climbing stairs   You may shower after 24 hours. The incisions can get wet but should not be under water in a bath. You may return to work as instructed by your surgeon. CARE OF SURGICAL SITE   Pain, colorful bruising and slight swelling at the incision sites is usually normal   If you experience fever, chills, inability to urinate, nausea with vomiting, severe diarrhea  or severe, cramping abdominal pain please contact your surgeon    APPOINTMENT   Call the office when you arrive home after surgery and make an appointment to see your surgeon in one week. Should you develop any problems prior to your scheduled appointment, do not hesitate  to call the office. [phone]  [phone]    Please call to set up your 1 week postop office visit    Address:  [place], [place]     Diet after repair of Hiatus Hernia, Paraesophageal hernia with fundoplication    Dietary Advice: Following surgery, swallowing may be difficult as a result of swelling around the esophagus (food pipe).  It may take a month or more for swallowing to feel normal again with all foods. Four stages of diet are advised. In each stage, when swallowing feels normal, you can move on to the next stage. Most Importantly     -Have small frequent meals and snacks, rather than large meals   -East slowly and chew foods well   -Have moist foods   -If any foods stick, stop eating, relax and allow time for food to clear. Try and drink water to wash the food down: if that fails, try soda water. If foods remains stuck, contact the hospital or your surgeon.      Avoid the following until swallowing is free and easy ( usually 4 weeks):     -Fresh bread   -Rice   -Cake   -hard biscuits   -grilled and fried meat, especially steak, chicken, unless pureed, minced or finely chopped   -aerated drinks ( soft drinks, milkshakes- unless soda water is required to relieve blockage)   -highly spiced foods (avoid for 6 weeks)    Stage 1: 2-5 days    Fluids and semi-fluid items only- these should be smooth and with no lumps     -water, juice, cordial (no soft drinks)   -Mild-plain, flavored (not milkshakes)   -Tea, coffee (not too hot)   -soups (strained or finely pureed)   -ice cream, custard, jelly   -yogurt (plain, vanilla or honey-not with seeds or pieces of fruit)   -potato, pumpkin (finely mashed)   -Gravy, white sauce (no lumps)   -food pureed to a thin consistency ( no lumps)    A  or  is useful    Stage 2: 1-2 weeks    Mashed and very soft foods only- soft lumps able to be mashed with a fork    Add- in:     -Porridge, breakfast cereals such a Weetabix, cornflakes, rice krispies, well softened with mild or hot water   -fruit- fresh fruit (soft, well ripened) stewed or tinned fruit (soft or puree)   -Vegetables - well cooked, soft, mashed or pureed   -Pasta (spaghetti, noodles) well cooked, soft   -Pureed meats, pureed chicken- can be with gravy in a thick soup, or serviced with mashed/pureed vegetables   -Fish: fresh ( take care to remove all bones) or canned tune, salmon (Mashed, no bones)   -Eggs- soft boiled, scrambled, poached    Stage 3: 1-2 weeks    Light foods with more texture: chew well    Add in:   -Tender meats, minced, stews   -Chicken- minced or finely chopped   -Salads   -Toast   -Biscuits   -Alcohol in small quantities if desired    Stage 4: gradual return to normal eating    Gradually add in firm foods. Try the food in the avoid list in small amounts one by one. Chew these foods fairly well. After about 4 weeks, you should be able to eat a full range of foods.  However, you are advised to:     -Continue with small meals and between-meals snacks if need to satisfy your appetite rather than large meals   -Continue to chew all foods well      If you are unable to eat a proper diet after about 4 weeks, please contact your surgeon as your surgeon may wish to see you earlier than planned in the office    Food Suggestions:    Stage 1    Breakfast:     -choose from: glass of mild, smooth yogurt, custard, jelly, tea/coffee, juice     Lunch   -choose from strained soup, finely mashed potato and pumpkin, gravy, white sauce, tomato sauce, jelly, custard, ice cream, cordial, juice    Dinner   -Choose from strained soup, enamorado mashed potato, mash carrot, gravy, white sauce, ice cream, jelly, tea, coffee, juice    Stage 2:    Breakfast idea:     -choose from porridge or softened cereal with mil and sugar, soft boiled egg    Lunch idea     -Choose from smooth soup, mashed tuna or salmon with noodles and white sauce, pureed met with mashed or pureed vegetables, pureed or mash fruit    Dinner idea     -Choose from purred Hellen, poached fish fillets with white sauce, mashed potatos, pureed vegetable, pureed or mashed fruit, custard    Stage 3:    Breakfast ideas     -Choose from any of the above, plus toast and spreads, baked beans, cheese and tomato     Lunch ideas     -Choose from any of the above plus soup, tender braised meat and vegetables, fish mornay, canned spaghetti, creamed corn, lentils, legumes(well cooked), cheese, salad, soft fruit (tinned or fresh)    Dinner ideas     -Any of the above, plus pasta and bolognaise sauce, meat casserole, cottage pie, steamed fish, well cooked vegetables, soft, fruit, fresh or tinned    Between meal snack ideas:     -Stage 1: milk (plain or flavored), cordial,juice, smooth yogurt   -Stage 2: soft or mashed fruit, custard   -Stage 3: ripe fresh fruit, cheese, biscuits        Can restart plavix Friday May 12*

## 2023-05-11 NOTE — PLAN OF CARE
Patient is alert and oriented X 4. Tele/SB. VSS. RA. Abdomen is soft and tender. Hypoactive bowel sounds. Lung sounds are clear. Denies SOB and chest pain. Five lap sites with skin glue open to air. Has minimal gas pains. Pain is being controlled with PRN medications per MAR. Updated on plan of care. Call light in  Reach.

## 2024-01-05 ENCOUNTER — APPOINTMENT (OUTPATIENT)
Dept: CT IMAGING | Facility: HOSPITAL | Age: 74
End: 2024-01-05
Attending: EMERGENCY MEDICINE
Payer: MEDICARE

## 2024-01-05 ENCOUNTER — HOSPITAL ENCOUNTER (EMERGENCY)
Facility: HOSPITAL | Age: 74
Discharge: HOME | End: 2024-01-05
Attending: EMERGENCY MEDICINE
Payer: MEDICARE

## 2024-01-05 ENCOUNTER — APPOINTMENT (OUTPATIENT)
Dept: GENERAL RADIOLOGY | Facility: HOSPITAL | Age: 74
End: 2024-01-05
Attending: EMERGENCY MEDICINE
Payer: MEDICARE

## 2024-01-05 VITALS
RESPIRATION RATE: 13 BRPM | SYSTOLIC BLOOD PRESSURE: 152 MMHG | WEIGHT: 180 LBS | BODY MASS INDEX: 28.93 KG/M2 | HEIGHT: 66 IN | TEMPERATURE: 97 F | HEART RATE: 61 BPM | OXYGEN SATURATION: 99 % | DIASTOLIC BLOOD PRESSURE: 80 MMHG

## 2024-01-05 DIAGNOSIS — R07.89 CHEST PAIN, ATYPICAL: Primary | ICD-10-CM

## 2024-01-05 DIAGNOSIS — I10 PRIMARY HYPERTENSION: ICD-10-CM

## 2024-01-05 LAB
ALBUMIN SERPL-MCNC: 4 G/DL (ref 3.4–5)
ALBUMIN/GLOB SERPL: 1.3 {RATIO} (ref 1–2)
ALP LIVER SERPL-CCNC: 104 U/L
ALT SERPL-CCNC: 36 U/L
ANION GAP SERPL CALC-SCNC: 3 MMOL/L (ref 0–18)
AST SERPL-CCNC: 23 U/L (ref 15–37)
BASOPHILS # BLD AUTO: 0.02 X10(3) UL (ref 0–0.2)
BASOPHILS NFR BLD AUTO: 0.5 %
BILIRUB SERPL-MCNC: 0.5 MG/DL (ref 0.1–2)
BUN BLD-MCNC: 16 MG/DL (ref 9–23)
CALCIUM BLD-MCNC: 9.6 MG/DL (ref 8.5–10.1)
CHLORIDE SERPL-SCNC: 109 MMOL/L (ref 98–112)
CO2 SERPL-SCNC: 31 MMOL/L (ref 21–32)
CREAT BLD-MCNC: 0.75 MG/DL
EGFRCR SERPLBLD CKD-EPI 2021: 83 ML/MIN/1.73M2 (ref 60–?)
EOSINOPHIL # BLD AUTO: 0.17 X10(3) UL (ref 0–0.7)
EOSINOPHIL NFR BLD AUTO: 4.4 %
ERYTHROCYTE [DISTWIDTH] IN BLOOD BY AUTOMATED COUNT: 13.8 %
GLOBULIN PLAS-MCNC: 3.2 G/DL (ref 2.8–4.4)
GLUCOSE BLD-MCNC: 115 MG/DL (ref 70–99)
HCT VFR BLD AUTO: 41.9 %
HGB BLD-MCNC: 13.5 G/DL
IMM GRANULOCYTES # BLD AUTO: 0.01 X10(3) UL (ref 0–1)
IMM GRANULOCYTES NFR BLD: 0.3 %
LYMPHOCYTES # BLD AUTO: 0.97 X10(3) UL (ref 1–4)
LYMPHOCYTES NFR BLD AUTO: 25.1 %
MCH RBC QN AUTO: 27.8 PG (ref 26–34)
MCHC RBC AUTO-ENTMCNC: 32.2 G/DL (ref 31–37)
MCV RBC AUTO: 86.4 FL
MONOCYTES # BLD AUTO: 0.5 X10(3) UL (ref 0.1–1)
MONOCYTES NFR BLD AUTO: 12.9 %
NEUTROPHILS # BLD AUTO: 2.2 X10 (3) UL (ref 1.5–7.7)
NEUTROPHILS # BLD AUTO: 2.2 X10(3) UL (ref 1.5–7.7)
NEUTROPHILS NFR BLD AUTO: 56.8 %
OSMOLALITY SERPL CALC.SUM OF ELEC: 298 MOSM/KG (ref 275–295)
PLATELET # BLD AUTO: 149 10(3)UL (ref 150–450)
POTASSIUM SERPL-SCNC: 3.8 MMOL/L (ref 3.5–5.1)
PROT SERPL-MCNC: 7.2 G/DL (ref 6.4–8.2)
RBC # BLD AUTO: 4.85 X10(6)UL
SODIUM SERPL-SCNC: 143 MMOL/L (ref 136–145)
TROPONIN I SERPL HS-MCNC: 13 NG/L
TROPONIN I SERPL HS-MCNC: 7 NG/L
WBC # BLD AUTO: 3.9 X10(3) UL (ref 4–11)

## 2024-01-05 PROCEDURE — 85025 COMPLETE CBC W/AUTO DIFF WBC: CPT

## 2024-01-05 PROCEDURE — 85025 COMPLETE CBC W/AUTO DIFF WBC: CPT | Performed by: EMERGENCY MEDICINE

## 2024-01-05 PROCEDURE — 84484 ASSAY OF TROPONIN QUANT: CPT

## 2024-01-05 PROCEDURE — 93005 ELECTROCARDIOGRAM TRACING: CPT

## 2024-01-05 PROCEDURE — 99285 EMERGENCY DEPT VISIT HI MDM: CPT

## 2024-01-05 PROCEDURE — 93010 ELECTROCARDIOGRAM REPORT: CPT

## 2024-01-05 PROCEDURE — 96376 TX/PRO/DX INJ SAME DRUG ADON: CPT

## 2024-01-05 PROCEDURE — 71275 CT ANGIOGRAPHY CHEST: CPT | Performed by: EMERGENCY MEDICINE

## 2024-01-05 PROCEDURE — 96374 THER/PROPH/DIAG INJ IV PUSH: CPT

## 2024-01-05 PROCEDURE — 71045 X-RAY EXAM CHEST 1 VIEW: CPT | Performed by: EMERGENCY MEDICINE

## 2024-01-05 PROCEDURE — 80053 COMPREHEN METABOLIC PANEL: CPT

## 2024-01-05 PROCEDURE — 80053 COMPREHEN METABOLIC PANEL: CPT | Performed by: EMERGENCY MEDICINE

## 2024-01-05 PROCEDURE — 84484 ASSAY OF TROPONIN QUANT: CPT | Performed by: EMERGENCY MEDICINE

## 2024-01-05 RX ORDER — HYDRALAZINE HYDROCHLORIDE 20 MG/ML
10 INJECTION INTRAMUSCULAR; INTRAVENOUS ONCE
Status: COMPLETED | OUTPATIENT
Start: 2024-01-05 | End: 2024-01-05

## 2024-01-05 NOTE — ED INITIAL ASSESSMENT (HPI)
Pt stating left sided chest pain at 1500 today. Pt stating elevated BP readings afterwards. Pt stating head pressure and pain in left shoulder. Pt stating highest blood pressure reading of 170/82 today. BEFAST negative.

## 2024-01-06 LAB
ATRIAL RATE: 60 BPM
P AXIS: 49 DEGREES
P-R INTERVAL: 156 MS
Q-T INTERVAL: 406 MS
QRS DURATION: 94 MS
QTC CALCULATION (BEZET): 406 MS
R AXIS: 22 DEGREES
T AXIS: 78 DEGREES
VENTRICULAR RATE: 60 BPM

## 2024-01-06 NOTE — ED PROVIDER NOTES
Patient Seen in: [facility] Emergency Department      History     Chief Complaint   Patient presents with    Hypertension     Stated Complaint: HTN    Subjective:   HPI    Patient is a very pleasant 73-year-old woman with a remote coronary artery bypass surgery known coronary disease is medically managed who presents to the emergency room with high blood pressure.  Symptoms started at approximately 3 PM today she had a brief 10-second discomfort underneath her left rib.  Said he had a reoccurrence also lasting about 10 seconds.  No other chest pain or shortness of breath.  Her blood pressure was elevated.  It was 219/80 at home.  She says over the last couple days she has noticed some coldness in her hands particularly on the left.  No back pain.  No shoulder pain.  No other specific complaints.  Patient is otherwise at her medical baseline.    Objective:   Past Medical History:   Diagnosis Date    Abnormal finding on MRI of brain     Asthma     Coronary artery disease     s/p DOTTY to SVG OM. EF 55%    Depression     Depression with anxiety     Disorder of thyroid     Gastroesophageal reflux disease without esophagitis     High blood pressure     History of COVID-19 10/2021    symptoms: sinus infection; s/s resolved; not hospitalized    Hx of motion sickness     Years ago    Hypogammaglobulinemia (HCC)     Hypothyroidism, unspecified type     IBS (irritable bowel syndrome)     Mixed hyperlipidemia     EMELYN on CPAP 2012    Osteoarthritis     Osteopenia     Pneumonia due to organism 2013    PONV (postoperative nausea and vomiting)     Once many years ago    Sleep apnea     CPAP    Tinnitus     Visual impairment     glasses              Past Surgical History:   Procedure Laterality Date    CABG  2014    CARPAL TUNNEL RELEASE Right 2021    CATH DRUG ELUTING STENT  2016    COLONOSCOPY,BIOPSY  09/11/2012    diverticulosis    EGD N/A 09/25/2017    HERNIA SURGERY      SHOULDER ARTHROSCOPY Left 2020    THYROIDECTOMY   2013    TONSILLECTOMY  2003    UPPER GI ENDOSCOPY,BIOPSY  01/18/2012    UPPER GI ENDOSCOPY,BIOPSY  09/11/2012                Social History     Socioeconomic History    Marital status:    Tobacco Use    Smoking status: Never    Smokeless tobacco: Never   Vaping Use    Vaping Use: Never used   Substance and Sexual Activity    Alcohol use: Yes     Alcohol/week: 0.0 - 5.0 standard drinks of alcohol     Comment: socially    Drug use: No    Sexual activity: Yes     Partners: Male              Review of Systems    Positive for stated complaint: HTN  Other systems are as noted in HPI.  Constitutional and vital signs reviewed.      All other systems reviewed and negative except as noted above.    Physical Exam     ED Triage Vitals [01/05/24 1643]   BP (!) 219/80   Pulse 59   Resp 18   Temp 97.2 °F (36.2 °C)   Temp src Temporal   SpO2 99 %   O2 Device None (Room air)       Current:BP (!) 189/85   Pulse 57   Temp 97.2 °F (36.2 °C) (Temporal)   Resp 13   Ht 167.6 cm (5' 6\")   Wt 81.6 kg   LMP  (LMP Unknown)   SpO2 99%   BMI 29.05 kg/m²         Physical Exam    General: Patient is resting comfortably in no acute distress  HEENT: Normal cephalic atraumatic.  Nonicteric sclera.  Moist mucous membranes.  No meningismus.  No adenopathy  Lungs: No tachypnea.  Lungs clear to auscultation bilaterally without rales/rhonchi.  Equal breath sounds bilaterally  Cardiac: No tachycardia.  No murmurs.  Regular rate and rhythm.  Abdomen: Soft and nontender throughout.  No rebound or guarding  Extremities: No clubbing/cyanosis/edema.  Strong pulses in both arms skin: No rashes, no pallor  Neuro: Awake oriented ×3.  Nonfocal.  Good strength throughout    ED Course     Labs Reviewed   COMP METABOLIC PANEL (14) - Abnormal; Notable for the following components:       Result Value    Glucose 115 (*)     Calculated Osmolality 298 (*)     All other components within normal limits   CBC W/ DIFFERENTIAL - Abnormal; Notable for the  following components:    WBC 3.9 (*)     .0 (*)     Lymphocyte Absolute 0.97 (*)     All other components within normal limits   TROPONIN I HIGH SENSITIVITY - Normal   CBC WITH DIFFERENTIAL WITH PLATELET    Narrative:     The following orders were created for panel order CBC With Differential With Platelet.  Procedure                               Abnormality         Status                     ---------                               -----------         ------                     CBC W/ DIFFERENTIAL[[id number]]          Abnormal            Final result                 Please view results for these tests on the individual orders.   TROPONIN I HIGH SENSITIVITY   RAINBOW DRAW BLUE     EKG    Rate, intervals and axes as noted on EKG Report.  Rate: 60  Rhythm: Sinus Rhythm  Reading: Sinus rhythm.  No acute ST-T wave changes.  Axis/intervals are noted.  Otherwise, agree with EKG report            CT chest: I personally reviewed the films and my independent interpertaion showed no obvious PE.  Official report shows no PE or aortic dissection.  No acute abnormality.   X-ray: No acute findings.  Official report reviewed.  Blood work reviewed.  Troponin 7.  White count 3.9.         MDM      Patient presents with hypertension, atypical chest pain very brief.  Known coronary disease.  EKG is reassuring.  First troponin is 7.  Will check a 2-hour troponin.  Will proceed to CT chest to rule out dissection or other pathology.  Will treat blood pressure with IV hydralazine.  Differential includes atypical coronary disease, aortic dissection, hypertensive related chest pain, other.  Will reassess after imaging and treatment.    Initial troponin 7.  Blood work otherwise reassuring.  CTA chest was performed and was negative for dissection or other pathology.  Patient received 10 of hydralazine.  Blood pressure improved though came elevated a second time received a second dose.  Patient is resting comfortably.  Patient was  discussed with duly cardiology.  If negative troponin will discharge home.  Would increase her losartan to 100 mg (50 twice a day).  Continue her other medications.  Follow-up with her cardiologist on Monday or Tuesday of next week.  Return to the ER if recurrent chest pain, shortness of breath, new complaints.                               Medical Decision Making      Disposition and Plan     Clinical Impression:  1. Chest pain, atypical    2. Primary hypertension         Disposition:  There is no disposition on file for this visit.  There is no disposition time on file for this visit.    Follow-up:  Sage Layne MD  [address] [place] [address]  [address]  [facility] [place] [address] [phone]    Follow up in 3 day(s)            Medications Prescribed:  Current Discharge Medication List

## 2024-01-06 NOTE — DISCHARGE INSTRUCTIONS
If recurrent chest pain, return to the ER.  Continue medication.  Would increase your losartan to 50 mg twice a day.  Continue other medications.  Follow-up with your cardiologist next week.  Return if worsening symptoms, new complaints.  Also included increase your Toprol to 25 mg twice a day.

## 2024-11-12 ENCOUNTER — HOSPITAL ENCOUNTER (OUTPATIENT)
Dept: CV DIAGNOSTICS | Facility: HOSPITAL | Age: 74
Discharge: HOME | End: 2024-11-12
Attending: CLINICAL NURSE SPECIALIST
Payer: MEDICARE

## 2024-11-12 DIAGNOSIS — I25.810 CORONARY ARTERY DISEASE INVOLVING CORONARY BYPASS GRAFT OF NATIVE HEART: ICD-10-CM

## 2024-11-12 DIAGNOSIS — R06.02 SOB (SHORTNESS OF BREATH): ICD-10-CM

## 2024-11-12 PROCEDURE — 93306 TTE W/DOPPLER COMPLETE: CPT | Performed by: CLINICAL NURSE SPECIALIST

## 2025-03-03 ENCOUNTER — HOSPITAL ENCOUNTER (EMERGENCY)
Facility: HOSPITAL | Age: 75
Discharge: HOME | End: 2025-03-03
Attending: EMERGENCY MEDICINE
Payer: MEDICARE

## 2025-03-03 ENCOUNTER — APPOINTMENT (OUTPATIENT)
Dept: GENERAL RADIOLOGY | Facility: HOSPITAL | Age: 75
End: 2025-03-03
Attending: EMERGENCY MEDICINE
Payer: MEDICARE

## 2025-03-03 ENCOUNTER — APPOINTMENT (OUTPATIENT)
Dept: CT IMAGING | Facility: HOSPITAL | Age: 75
End: 2025-03-03
Attending: EMERGENCY MEDICINE
Payer: MEDICARE

## 2025-03-03 ENCOUNTER — APPOINTMENT (OUTPATIENT)
Dept: MRI IMAGING | Facility: HOSPITAL | Age: 75
End: 2025-03-03
Attending: EMERGENCY MEDICINE
Payer: MEDICARE

## 2025-03-03 VITALS
HEIGHT: 66 IN | HEART RATE: 50 BPM | OXYGEN SATURATION: 98 % | BODY MASS INDEX: 28.93 KG/M2 | SYSTOLIC BLOOD PRESSURE: 142 MMHG | DIASTOLIC BLOOD PRESSURE: 73 MMHG | TEMPERATURE: 99 F | WEIGHT: 180 LBS | RESPIRATION RATE: 14 BRPM

## 2025-03-03 DIAGNOSIS — R20.2 PARESTHESIA OF LEFT UPPER EXTREMITY: ICD-10-CM

## 2025-03-03 DIAGNOSIS — H53.9 VISUAL DISTURBANCE: Primary | ICD-10-CM

## 2025-03-03 LAB
ALBUMIN SERPL-MCNC: 4.8 G/DL (ref 3.2–4.8)
ALBUMIN/GLOB SERPL: 2.3 {RATIO} (ref 1–2)
ALP LIVER SERPL-CCNC: 97 U/L
ALT SERPL-CCNC: 29 U/L
ANION GAP SERPL CALC-SCNC: 8 MMOL/L (ref 0–18)
APTT PPP: 26.7 SECONDS (ref 23–36)
AST SERPL-CCNC: 27 U/L (ref ?–34)
ATRIAL RATE: 52 BPM
BASOPHILS # BLD AUTO: 0.01 X10(3) UL (ref 0–0.2)
BASOPHILS NFR BLD AUTO: 0.3 %
BILIRUB SERPL-MCNC: 0.8 MG/DL (ref 0.2–1.1)
BUN BLD-MCNC: 15 MG/DL (ref 9–23)
CALCIUM BLD-MCNC: 9.7 MG/DL (ref 8.7–10.6)
CHLORIDE SERPL-SCNC: 107 MMOL/L (ref 98–112)
CO2 SERPL-SCNC: 28 MMOL/L (ref 21–32)
CREAT BLD-MCNC: 0.9 MG/DL
EGFRCR SERPLBLD CKD-EPI 2021: 67 ML/MIN/1.73M2 (ref 60–?)
EOSINOPHIL # BLD AUTO: 0.15 X10(3) UL (ref 0–0.7)
EOSINOPHIL NFR BLD AUTO: 3.8 %
ERYTHROCYTE [DISTWIDTH] IN BLOOD BY AUTOMATED COUNT: 13.7 %
GLOBULIN PLAS-MCNC: 2.1 G/DL (ref 2–3.5)
GLUCOSE BLD-MCNC: 109 MG/DL (ref 70–99)
HCT VFR BLD AUTO: 39.2 %
HGB BLD-MCNC: 12.9 G/DL
IMM GRANULOCYTES # BLD AUTO: 0.01 X10(3) UL (ref 0–1)
IMM GRANULOCYTES NFR BLD: 0.3 %
INR BLD: 1.14 (ref 0.8–1.2)
LYMPHOCYTES # BLD AUTO: 1.32 X10(3) UL (ref 1–4)
LYMPHOCYTES NFR BLD AUTO: 33.8 %
MCH RBC QN AUTO: 28.5 PG (ref 26–34)
MCHC RBC AUTO-ENTMCNC: 32.9 G/DL (ref 31–37)
MCV RBC AUTO: 86.7 FL
MONOCYTES # BLD AUTO: 0.49 X10(3) UL (ref 0.1–1)
MONOCYTES NFR BLD AUTO: 12.5 %
NEUTROPHILS # BLD AUTO: 1.93 X10 (3) UL (ref 1.5–7.7)
NEUTROPHILS # BLD AUTO: 1.93 X10(3) UL (ref 1.5–7.7)
NEUTROPHILS NFR BLD AUTO: 49.3 %
OSMOLALITY SERPL CALC.SUM OF ELEC: 297 MOSM/KG (ref 275–295)
P AXIS: 47 DEGREES
P-R INTERVAL: 162 MS
PLATELET # BLD AUTO: 167 10(3)UL (ref 150–450)
POTASSIUM SERPL-SCNC: 3.9 MMOL/L (ref 3.5–5.1)
PROT SERPL-MCNC: 6.9 G/DL (ref 5.7–8.2)
PROTHROMBIN TIME: 14.7 SECONDS (ref 11.6–14.8)
Q-T INTERVAL: 422 MS
QRS DURATION: 94 MS
QTC CALCULATION (BEZET): 392 MS
R AXIS: 7 DEGREES
RBC # BLD AUTO: 4.52 X10(6)UL
SODIUM SERPL-SCNC: 143 MMOL/L (ref 136–145)
T AXIS: 75 DEGREES
TROPONIN I SERPL HS-MCNC: 4 NG/L
VENTRICULAR RATE: 52 BPM
WBC # BLD AUTO: 3.9 X10(3) UL (ref 4–11)

## 2025-03-03 PROCEDURE — 36415 COLL VENOUS BLD VENIPUNCTURE: CPT

## 2025-03-03 PROCEDURE — 84484 ASSAY OF TROPONIN QUANT: CPT | Performed by: EMERGENCY MEDICINE

## 2025-03-03 PROCEDURE — 70498 CT ANGIOGRAPHY NECK: CPT | Performed by: EMERGENCY MEDICINE

## 2025-03-03 PROCEDURE — 80053 COMPREHEN METABOLIC PANEL: CPT | Performed by: EMERGENCY MEDICINE

## 2025-03-03 PROCEDURE — 99285 EMERGENCY DEPT VISIT HI MDM: CPT

## 2025-03-03 PROCEDURE — 70551 MRI BRAIN STEM W/O DYE: CPT | Performed by: EMERGENCY MEDICINE

## 2025-03-03 PROCEDURE — 93010 ELECTROCARDIOGRAM REPORT: CPT

## 2025-03-03 PROCEDURE — 71045 X-RAY EXAM CHEST 1 VIEW: CPT | Performed by: EMERGENCY MEDICINE

## 2025-03-03 PROCEDURE — 93005 ELECTROCARDIOGRAM TRACING: CPT

## 2025-03-03 PROCEDURE — 70450 CT HEAD/BRAIN W/O DYE: CPT | Performed by: EMERGENCY MEDICINE

## 2025-03-03 PROCEDURE — 70496 CT ANGIOGRAPHY HEAD: CPT | Performed by: EMERGENCY MEDICINE

## 2025-03-03 PROCEDURE — 85025 COMPLETE CBC W/AUTO DIFF WBC: CPT | Performed by: EMERGENCY MEDICINE

## 2025-03-03 PROCEDURE — 85730 THROMBOPLASTIN TIME PARTIAL: CPT | Performed by: EMERGENCY MEDICINE

## 2025-03-03 PROCEDURE — 85610 PROTHROMBIN TIME: CPT | Performed by: EMERGENCY MEDICINE

## 2025-03-03 NOTE — ED INITIAL ASSESSMENT (HPI)
Pt is a volunteer here.  Hx of CABG  Dx with sinus infection in jan 2025, was then prescribed lozenges for yeast and fungus infection in throat and nose.   Here today, states that she started to have unusual left eye symptoms- unable to focus well. No pain in eye. Tingling in left arm- but believes it may be her carpal tunnel. Also reports mild headache  Takes clopidogrel.

## 2025-03-03 NOTE — ED PROVIDER NOTES
Patient Seen in: [facility] Emergency Department      History     Chief Complaint   Patient presents with    Eye Visual Problem     Stated Complaint: volunteer here, left eye blurry vision for 5 minutes.    Subjective:   74-year-old female, history of coronary disease, history of carpal tunnel, presents with headache, visual disturbance and left arm tingling.  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  Not double.  Slightly blurry.  Also of the left frontal headache and some tingling in the left forearm.  She does carpal tunnel but that usually in the wrist, distal forearm tingling was new.  Culture was called.  Sent immediately to CT.  GCS is 15.  NIH of 1               Objective:     No pertinent past medical history.            No pertinent past surgical history.              No pertinent social history.                Physical Exam     ED Triage Vitals [03/03/25 1101]   /71   Pulse 56   Resp 18   Temp 98.9 °F (37.2 °C)   Temp src Temporal   SpO2 100 %   O2 Device None (Room air)       Current Vitals:   Vital Signs  BP: 142/73  Pulse: 50  Resp: 14  Temp: 98.9 °F (37.2 °C)  Temp src: Temporal  MAP (mmHg): 94    Oxygen Therapy  SpO2: 98 %  O2 Device: None (Room air)        Physical Exam  Vitals and nursing note reviewed.   Constitutional:       General: She is not in acute distress.     Appearance: She is not ill-appearing, toxic-appearing or diaphoretic.   HENT:      Head: Normocephalic and atraumatic.      Nose: Nose normal.      Mouth/Throat:      Mouth: Mucous membranes are moist.   Eyes:      Extraocular Movements: Extraocular movements intact.      Pupils: Pupils are equal, round, and reactive to light.   Cardiovascular:      Rate and Rhythm: Normal rate and regular rhythm.      Pulses: Normal pulses.   Pulmonary:      Effort: Pulmonary effort is normal. No respiratory distress.       Breath sounds: Normal breath sounds.   Musculoskeletal:         General: No tenderness. Normal range of motion.      Cervical back: Normal range of motion and neck supple.   Skin:     General: Skin is warm and dry.   Neurological:      Mental Status: She is alert and oriented to person, place, and time.      Cranial Nerves: No cranial nerve deficit.      Sensory: Sensory deficit present.   Psychiatric:         Mood and Affect: Mood normal.         Behavior: Behavior normal.       Reports slight decrease sensation of the left forearm when compared to the right.    ED Course     Labs Reviewed   COMP METABOLIC PANEL (14) - Abnormal; Notable for the following components:       Result Value    Glucose 109 (*)     Calculated Osmolality 297 (*)     A/G Ratio 2.3 (*)     All other components within normal limits   CBC WITH DIFFERENTIAL WITH PLATELET - Abnormal; Notable for the following components:    WBC 3.9 (*)     All other components within normal limits   PROTHROMBIN TIME (PT) - Normal   PTT, ACTIVATED - Normal   TROPONIN I HIGH SENSITIVITY - Normal   RAINBOW DRAW LAVENDER   RAINBOW DRAW LIGHT GREEN   RAINBOW DRAW BLUE     EKG    Rate, intervals and axes as noted on EKG Report.  Rate: 51  Rhythm: Sinus Rhythm  Reading: EKG sinus bradycardia 51 bpm.  Normal axis.  Abnormal ST-T segment in the anterior leads.  Could be artifactual, could be Brugada.  Will routine repeat EKG to compare.  Otherwise there are no significant changes from previous EKGs.  I suspect this may be artifactual    Patient placed on cardiac monitor for telemetry monitoring secondary to visual disturbance, numbness. Interpretation at bedside by me is sinus bradycardia.      Repeat EKG sinus bradycardia 52 bpm.  No ST elevations.  It was artifactual in lead V2, she does not have Brugada wave.  , QRS 94,  ms.       ED Course as of 03/04/25 0606  ------------------------------------------------------------  Time: 03/03 1400  Comment: ABCD2  score is 2...low risk              MDM      MRI BRAIN (CPT=70551)    Result Date: 3/3/2025  CONCLUSION:  1. No acute intracranial abnormality.  Negative for acute infarct. 2. Stable cystic foci in the right caudate nucleus head.  Interval stability supports benignity. 3. Details as above.  Continued clinical correlation recommended.   LOCATION:  [facility]   Dictated by (CST): Ney Coleman MD on 3/03/2025 at 7:10 PM     Finalized by (CST): Ney Coleman MD on 3/03/2025 at 7:15 PM       XR CHEST AP PORTABLE  (CPT=71045)    Result Date: 3/3/2025  CONCLUSION:  No acute findings.  Continued clinical correlation recommended.   LOCATION:  [facility]      Dictated by (CST): Ney Coleman MD on 3/03/2025 at 1:16 PM     Finalized by (CST): Ney Coleman MD on 3/03/2025 at 1:17 PM       CT STROKE CTA BRAIN/CTA NECK (W IV)(CPT=70496/87494)    Result Date: 3/3/2025  CONCLUSION:  1. No intracranial large vessel occlusion identified.  2. If acute stroke remains a clinical concern follow-up MRI recommended. 3. No evidence for carotid or vertebral artery dissection.  Mild atheromatous plaque noted in the carotid bulbs and proximal internal carotid arteries bilaterally without hemodynamically significant stenosis by NASCET criteria. 4. There is a left lobe thyroid nodule identified.  Recommend follow-up thyroid sonogram on a nonemergent basis. 5. Please see above for details.  Critical test results were called to Dr. Morillo at 1334 hours on 3/3/2025.  There is appropriate read back.    LOCATION:  [facility]   Dictated by (CST): Higinio Lopez MD on 3/03/2025 at 12:21 PM     Finalized by (CST): Higinio Lopez MD on 3/03/2025 at 12:36 PM       CT STROKE BRAIN (NO IV)(CPT=70450)    Result Date: 3/3/2025  CONCLUSION:   1. No acute intracranial hemorrhage or hydrocephalus.  Minimal chronic small vessel ischemic disease.  If there is clinical concern for acute ischemia/infarction, an MRI of the brain would be recommended for further evaluation.  2.   There are hypodense foci within the anterior right basal ganglia/right caudate.  These are better characterized on the prior brain MRI from 2/9/2018.  Nonemergent follow-up outpatient brain MR imaging is recommended for further assessment.   Critical results were discussed with Dr. Morillo at 1205 hours on 3/3/2025. Critical results were read back.   LOCATION:  [id number]   Dictated by (CST): Stromberg, LeRoy, MD on 3/03/2025 at 12:00 PM     Finalized by (CST): Stromberg, LeRoy, MD on 3/03/2025 at 12:06 PM          I independent interpreted a CT the brain without any obvious signs of acute hemorrhage    Differential diagnosis includes, but not limited to, TIA, CVA, paresthesia, retinal attachment, CRAO, CRVO     at bedside helpful to provide information on the history presenting illness    external chart review demonstrates outpatient general practice visit as recently as January of this year    74-year-old female presents with facial disturbance and some left arm paresthesias both of presents resolved.  NIH is 0.  ABCD 2 score is a 2.  Stressing no distress.  Gait is steady.  No focal deficits.  MRI is pending.  If negative will follow-up in the TIA clinic.  She is already on aspirin and a statin.  She is agreeable.  MRI pending. Endorsed to oncoming physician with dispo pending MRI results    Patient was screened and evaluated during this visit.  As the treating physician attending to the patient, I determined within reasonable clinical confidence and prior to discharge, that an emergency medical condition was not or was no longer present.  There was no indication for further evaluation, treatment, or admission on an emergency basis.  Comprehensive verbal and written discharge and follow-up instructions were provided to help prevent relapse or worsening.  Patient was instructed to follow-up with their primary care provider for further evaluation and treatment, return immediately to ER for worsening, concerning,  new, or changing/persisting symptoms. I discussed the case with the patient and they had no questions, complaints, or concerns.  Patient was comfortable going home.     Per the discharge paperwork, patients are encouraged to and given instructions on how to sign up for [facility], where they have access to their records, including any/all incidental findings.     This note was prepared using Dragon Medical voice recognition dictation software. As a result errors may occur. When identified these errors have been corrected. While every attempt is made to correct errors during dictation discrepancies may still exist    Note to patient: The 21st Century Cures Act makes medical notes like these available to patients in the interest of transparency. However, this is a medical document intended as peer to peer communication. It is written in medical language and may contain abbreviations or verbiage that are unfamiliar. It may appear blunt or direct. Medical documents are intended to carry relevant information, facts as evident, and the clinical opinion of the practitioner.       Medical Decision Making      Disposition and Plan     Clinical Impression:  1. Visual disturbance    2. Paresthesia of left upper extremity         Disposition:  Discharge  3/3/2025  7:18 pm    Follow-up:  [address]  [address] [address] [phone]  Call  choose option 1 for general neurology and state that you are following up for TIA          Medications Prescribed:  Discharge Medication List as of 3/3/2025  7:58 PM              Supplementary Documentation:            TNK/ NI Documentation:    Date/Time last known well:   3/3/166238:30 AM    NIHSS on presentation: 0     Chief Complaint   Patient presents with    Eye Visual Problem     IV Tenecteplase (TNK) administered: No; Patient is not a Candidate for IV TNK due to: Mild nondisabling symptoms or rapidly  improving symptoms    Candidate for Endovascular thrombectomy (EVT): No; Patient is not a candidate for Endovascular Thrombectomy due to: No large vessel occlusion ( LVO)  on CTA/MRA imaging      Disposition: There is no disposition on file for this visit.

## 2025-03-03 NOTE — SIGNIFICANT EVENT
Stroke Alert initiated ED  Last Know Normal at 1025  Pre-morbid MRS 0  Initial NIHSS 0 including mild headache   Patient accompanied to CT dept  Repeat NIHSS completed back in ED room    NIH Stroke Scale  1a.  Level of consciousness: 0   1b. LOC questions:  0   1c. LOC commands: 0   2.  Best Gaze: 0   3. Visual: 0   4. Facial Palsy: 0   5a. Motor left arm: 0   5b.  Motor right arm: 0   6a. Motor left le   6b.  Motor right le   7. Limb Ataxia: 0   8.  Sensory: 0   9. Best Language:  0   10. Dysarthria: 0   11. Extinction and Inattention: 0     Total:   0          Dr Carrasco notified via perfect serve and aware.     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 Pt states left eye blurred vision, with numbness /tingling on left arm, wrist, and hand.  Pt states previously with carpel tunnel in 2024, and thought numbness/tingling related to that, but symptoms continued and did not stop, so patient seeked help. Upon assessment pt with symptoms fully resolved, pt alert and conversational. Pt complaints of increase in wooshing sound in right ear for the past 4 days. Pt's states left leg sensation different from right is baseline d/t hx of cardiac bypass.       Please refer to the Stroke Data Flowsheets for additional information and Stroke Alert response times.

## 2025-03-04 ENCOUNTER — TELEPHONE (OUTPATIENT)
Dept: NEUROLOGY | Facility: CLINIC | Age: 75
End: 2025-03-04

## 2025-03-04 LAB
ATRIAL RATE: 51 BPM
P AXIS: 52 DEGREES
P-R INTERVAL: 164 MS
Q-T INTERVAL: 410 MS
QRS DURATION: 94 MS
QTC CALCULATION (BEZET): 377 MS
R AXIS: 12 DEGREES
T AXIS: 63 DEGREES
VENTRICULAR RATE: 51 BPM

## 2025-03-04 NOTE — TELEPHONE ENCOUNTER
TIA CLINIC SCREENING    Situation  Date of ED visit/TIA diagnosis: 3/3/2025    Time of discharge from ED: 2004    Is patient currently admitted?  No If YES - TIA Clinic Appointment not required.    Delete Background and Assessment sections and skip to Recommendation.     Background  Does patient already see an [facility] neurologist? No  Name:  If YES - TIA Clinic Appointment not required.    Route completed message on to patient's neurologist for follow up recommendation.       Assessment  Patient's current anti-platelet therapy: Plavix   Patient's current statin therapy: Atorvastatin   Has 2D Echo with bubble test been done? No  Date:      Recommendation  Is TIA Clinic Appointment indicated?  Unsure - routing encounter to Stroke Director for recommendation   If YES Contact patient to schedule appointment NO LATER THAN 48 HOURS AFTER ED DISCHARGE.    If message left for patient, document message and route encounter to [facility] to follow up.  If patient declines appointment within 24-48 hours, document that and find an appointment suitable to patient's schedule.  If patient declines appointment, document that and reason for decline.   If UNSURE  Route encounter to clinic provider for recommendation.   If NO  indicate reason and sign encounter.       TIA APPOINTMENT STATUS: Routing to provider for further recommendation.

## 2025-03-04 NOTE — TELEPHONE ENCOUNTER
Pt called to schedule her ED f/u appt. I explained we would call her to schedule the appt after Provider reviews the ER notes.

## 2025-03-05 NOTE — TELEPHONE ENCOUNTER
Spoke with patient and appointment scheduled with Dr Cornelius on 5/7 at 2:20 pm in [place] and patient placed on wait list.

## 2025-05-07 ENCOUNTER — OFFICE VISIT (OUTPATIENT)
Dept: NEUROLOGY | Facility: CLINIC | Age: 75
End: 2025-05-07
Payer: MEDICARE

## 2025-05-07 VITALS
BODY MASS INDEX: 30 KG/M2 | WEIGHT: 183.63 LBS | SYSTOLIC BLOOD PRESSURE: 118 MMHG | DIASTOLIC BLOOD PRESSURE: 66 MMHG | HEART RATE: 53 BPM | RESPIRATION RATE: 16 BRPM

## 2025-05-07 DIAGNOSIS — G62.9 PERIPHERAL POLYNEUROPATHY: ICD-10-CM

## 2025-05-07 DIAGNOSIS — R29.818 TRANSIENT NEUROLOGICAL SYMPTOMS: ICD-10-CM

## 2025-05-07 RX ORDER — AMLODIPINE BESYLATE 5 MG/1
5 TABLET ORAL DAILY
COMMUNITY

## 2025-05-07 NOTE — PATIENT INSTRUCTIONS
Refill policies:    Allow 2-3 business days for refills; controlled substances may take longer.  Contact your pharmacy at least 5 days prior to running out of medication and have them send an electronic request or submit request through the “request refill” option in your [employer] account.  Refills are not addressed on weekends; covering physicians do not authorize routine medications on weekends.  No narcotics or controlled substances are refilled after noon on Fridays or by on call physicians.  By law, narcotics must be electronically prescribed.  A 30 day supply with no refills is the maximum allowed.  If your prescription is due for a refill, you may be due for a follow up appointment.  To best provide you care, patients receiving routine medications need to be seen at least once a year.  Patients receiving narcotic/controlled substance medications need to be seen at least once every 3 months.  In the event that your preferred pharmacy does not have the requested medication in stock (e.g. Backordered), it is your responsibility to find another pharmacy that has the requested medication available.  We will gladly send a new prescription to that pharmacy at your request.    Scheduling Tests:    If your physician has ordered radiology tests such as MRI or CT scans, please contact Central Scheduling at [phone] right away to schedule the test.  Once scheduled, the [facility] Centralized Referral Team will work with your insurance carrier to obtain pre-certification or prior authorization.  Depending on your insurance carrier, approval may take 3-10 days.  It is highly recommended patients assure they have received an authorization before having a test performed.  If test is done without insurance authorization, patient may be responsible for the entire amount billed.      Precertification and Prior Authorizations:  If your physician has recommended that you have a procedure or additional testing performed the [facility]  Centralized Referral Team will contact your insurance carrier to obtain pre-certification or prior authorization.    You are strongly encouraged to contact your insurance carrier to verify that your procedure/test has been approved and is a COVERED benefit.  Although the [facility] Centralized Referral Team does its due diligence, the insurance carrier gives the disclaimer that \"Although the procedure is authorized, this does not guarantee payment.\"    Ultimately the patient is responsible for payment.   Thank you for your understanding in this matter.  Paperwork Completion:  If you require FMLA or disability paperwork for your recovery, please make sure to either drop it off or have it faxed to our office at [phone]. Be sure the form has your name and date of birth on it.  The form will be faxed to our Forms Department and they will complete it for you.  There is a 25$ fee for all forms that need to be filled out.  Please be aware there is a 10-14 day turnaround time.  You will need to sign a release of information (REGULO) form if your paperwork does not come with one.  You may call the Forms Department with any questions at [phone].  Their fax number is [phone].

## 2025-05-07 NOTE — PROGRESS NOTES
HPI:    Patient ID: Sharona Ramos is a 74 year old female.    HPI  Sharona Ramos is a 74 year old female with history CABG, HTN, HLD who presents for evaluation of an episode of transient neurological symptoms. On March patient was at her volunteer work at [employer], her vision felt off, blurry vision and had tingling in the LUE from elbow down to the hand ( reports has carpal tunnel on left side but usually does not involve the distal forearm) Blurry vision lasted for 5 minutes, no complete vision loss or any diplopia. No associated headaches or eye pain  She went to the [place] ED, CT head/CTA head and neck obtained was negative for any acute abnormality.  MRI of the brain shows stable cystic foci in the right quadrigeminal nucleus which patient had it for many years.  Reports she had ophthalmology evaluation done which was unremarkable.  About 2 weeks on 3/25 th had headache in the front and top of head and sinus pressure.  Has history of chronic sinusitis and allergic rhinitis. Saw ENT on April 1st and diflucan was prescribed and turbinate reduction surgery was recommended    Additional c/o tingling in the toes and cold sensation in both fingers and toes. No color changes . She was diagnosed with peripheral neuropathy, had EMG done with [facility]. No history for diabetes or prediabetes. + family history of hammer toes. History of bilateral carpal tunnel s/p right carpal tunnel surgery,       HISTORY:  Past Medical History[1]   Past Surgical History[2]   Family History[3]   Short Social Hx on File[4]     Review of Systems   Constitutional: Negative.    HENT: Negative.     Eyes: Negative.    Respiratory: Negative.     Cardiovascular: Negative.    Gastrointestinal: Negative.    Endocrine: Negative.    Genitourinary: Negative.    Musculoskeletal: Negative.    Skin: Negative.    Allergic/Immunologic: Negative.    Neurological: Negative.    Hematological: Negative.    Psychiatric/Behavioral:  Negative.     All other systems reviewed and are negative.         Current Medications[5]  Allergies:Allergies[6]  PHYSICAL EXAM:   Physical Exam    Blood pressure 118/66, pulse 53, resp. rate 16, weight 183 lb 9.6 oz (83.3 kg), not currently breastfeeding.  General Appearance: Well nourished, well developed, no apparent distress.   Cardiovascular: Normal rate, regular rhythm and normal heart sounds.    Pulmonary/Chest: Effort normal and breath sounds normal.   Abdominal: Soft. Bowel sounds are normal.   Skin: dry, clean and intact  Ext: + dilated veins both feet left> right. No edema seen.   peripheral pulses present + hammer toes    Neurological:  Patient is awake, alert and oriented to person, place and time   Normal memory, attention/concentration, speech and language.    Cranial Nerves:   II: Visual acuity: normal  III: Pupils: equal, round, reactive to light  III,IV,VI: Extra Ocular Movements: intact  V: Facial sensation: intact  VII: Facial strength: intact  VIII: Hearing: intact  IX: Palate: intact  XI: Shoulder shrug: intact  XII: Tongue movement: normal    Motor : Normal tone. Strength is  5 out of 5 in all extremities bilaterally.  DTR: 1+ in arms, 2+ patellar and trace on the right and absent on left    Sensory: Intact to pinprick with some hyperesthesias in the toes.  Moderate to severe vibration loss in both toes.     Coordination: Finger-to-nose normal bilaterally without evidence of dysmetria.    Gait: normal casual gait     TESTS/IMAGING:       MRI brain:  3/3/2025      Impression   CONCLUSION:    1. No acute intracranial abnormality.  Negative for acute infarct.  2. Stable cystic foci in the right caudate nucleus head.  Interval stability supports benignity.  3. Details as above.  Continued clinical correlation recommended.           CTA stroke/CTA brain and neck with and without contrast 3/3/20/2025    Impression   CONCLUSION:    1. No intracranial large vessel occlusion identified.    2. If acute  stroke remains a clinical concern follow-up MRI recommended.  3. No evidence for carotid or vertebral artery dissection.  Mild atheromatous plaque noted in the carotid bulbs and proximal internal carotid arteries bilaterally without hemodynamically significant stenosis by NASCET criteria.  4. There is a left lobe thyroid nodule identified.  Recommend follow-up thyroid sonogram on a nonemergent basis.  5. Please see above for details.            EMG and NCS  BLE: Jan 2024 ( Dr Light)  The findings are consistent with a sensory and motor axonal polyneuropathy.  There is no evidence of a myopathy or root irritation noted.                   ASSESSMENT/PLAN:       ICD-10-CM    1. Transient neurological symptoms  R29.818       2. Peripheral polyneuropathy  G62.9         Transient episodes of left eye blurry vision  without any pain non specific  CTA head and neck and MRI brain obtained negative for any acute abnormality and no significant carotid stenosis.  Ophthalmology evaluation       2.  Peripheral neuropathy BLE, idiopathic vs hereditary given history of hammer toes in family.  Had seen Dr. Light and EMG nerve conduction study demonstrated sensorimotor axonal polyneuropathy.    Neuropathic lab work done was unremarkable  Advise the patient to discuss with PCP regarding venous doppler to assess for venous reflux given varicose veins  Chronic venous insufficiency is associated with peripheral neuropathy    3. Bilateral carpal tunnel s/p right carpal tunnel surgery  Continue conservative management. May consider EMG/NCS if LUE tingling numbness recurs or worsens.    Thank you for allowing us to participate in your patient's care.         Grecia Cornelius MD   [facility] [place]      This note was prepared using Dragon Medical voice recognition dictation software. As a result errors may occur. When identified these errors have been corrected. While every attempt is made to correct errors during dictation  discrepancies may still exist         Meds This Visit:  Requested Prescriptions      No prescriptions requested or ordered in this encounter       Imaging & Referrals:  None     ID#[id number]         [1]   Past Medical History:   Abnormal finding on MRI of brain    Asthma (HCC)    Coronary artery disease    s/p DOTTY to SVG OM. EF 55%    Depression    Depression with anxiety    Disorder of thyroid    Gastroesophageal reflux disease without esophagitis    High blood pressure    History of COVID-19    symptoms: sinus infection; s/s resolved; not hospitalized    Hx of motion sickness    Years ago    Hypogammaglobulinemia (HCC)    Hypothyroidism, unspecified type    IBS (irritable bowel syndrome)    Mixed hyperlipidemia    EMELYN on CPAP    Osteoarthritis    Osteopenia    Pneumonia due to organism    PONV (postoperative nausea and vomiting)    Once many years ago    Sleep apnea    CPAP    Tinnitus    Visual impairment    glasses   [2]   Past Surgical History:  Procedure Laterality Date    Cabg  2014    Carpal tunnel release Right 2021    Cath drug eluting stent  2016    Colonoscopy,biopsy  09/11/2012    diverticulosis    Egd N/A 09/25/2017    Hernia surgery      Shoulder arthroscopy Left 2020    Thyroidectomy  2013    Tonsillectomy  2003    Upper gi endoscopy,biopsy  01/18/2012    Upper gi endoscopy,biopsy  09/11/2012   [3]   Family History  Problem Relation Age of Onset    Lipids Father     Dementia Father     Heart Attack Father 90    Lipids Mother     Stroke Mother 55    Dementia Mother     Heart Attack Brother 62   [4]   Social History  Socioeconomic History    Marital status:    Tobacco Use    Smoking status: Never    Smokeless tobacco: Never   Vaping Use    Vaping status: Never Used   Substance and Sexual Activity    Alcohol use: Yes     Alcohol/week: 0.0 - 5.0 standard drinks of alcohol     Comment: socially    Drug use: No    Sexual activity: Yes     Partners: Male   Other Topics Concern    Caffeine Concern Yes     Exercise Yes     Social Drivers of Health     Food Insecurity: No Food Insecurity (10/8/2024)    Received from [facility] - Food Insecurity     Worried About Running Out of Food in the Last Year: No     Ran Out of Food in the Last Year: No   Transportation Needs: No Transportation Needs (10/8/2024)    Received from [facility] - Transportation     Lack of Transportation: No   Stress: No Stress Concern Present (10/8/2024)    Received from [facility]    [employer] - Occupational Stress Questionnaire     Feeling of Stress : Only a little   Housing Stability: Not At Risk (10/8/2024)    Received from [facility] - Housing/Utilities     Has Housing: Yes     Worried About Losing Housing: No     Unable to Get Utilities: No   [5]   Current Outpatient Medications   Medication Sig Dispense Refill    amLODIPine 5 MG Oral Tab Take 1 tablet (5 mg total) by mouth daily.      clopidogrel 75 MG Oral Tab Take 1 tablet (75 mg total) by mouth daily.  0    atorvastatin 40 MG Oral Tab Take 1 tablet (40 mg total) by mouth nightly.      chlorpheniramine 4 MG Oral Tab Take 1 tablet (4 mg total) by mouth every 4 (four) hours as needed for Allergies.      losartan 50 MG Oral Tab Take 1 tablet (50 mg total) by mouth daily.      acidophilus-pectin Oral Cap Take 1 capsule by mouth daily.      EUTHYROX 75 MCG Oral Tab Take 1 tablet (75 mcg total) by mouth before breakfast. 90 tablet 1    PARoxetine 20 MG Oral Tab Take 1 tablet by mouth once daily 90 tablet 1    Sodium Chloride-Xylitol (XLEAR SINUS CARE SPRAY NA) by Nasal route 2 (two) times daily.      acetaminophen 500 MG Oral Tab Take 1 tablet (500 mg total) by mouth every 6 (six) hours as needed for Pain.      Multiple Vitamin (ONE-DAILY MULTI VITAMINS) Oral Tab Take 1 tablet by mouth daily.      Cholecalciferol 100 MCG (4000 UT) Oral Cap Take 4,000 Units by mouth daily.        aspirin 81 MG Oral Tab  Take 1 tablet (81 mg total) by mouth nightly. 30 tablet 0   [6]   Allergies  Allergen Reactions    Biaxin [Clarithromycin] HIVES    Ceclor HIVES     The patient has tolerated amoxicilin and omnicef    Dander HIVES, Runny nose and ITCHING     Cats & dogs    Mold WHEEZING    Wheat Gluten UNKNOWN     Per allergy testing

## 2025-07-15 ENCOUNTER — OFFICE VISIT (OUTPATIENT)
Dept: UROLOGY | Facility: CLINIC | Age: 75
End: 2025-07-15
Attending: OBSTETRICS & GYNECOLOGY
Payer: MEDICARE

## 2025-07-15 VITALS — WEIGHT: 182.19 LBS | TEMPERATURE: 98 F | BODY MASS INDEX: 29.28 KG/M2 | HEIGHT: 66 IN | RESPIRATION RATE: 18 BRPM

## 2025-07-15 DIAGNOSIS — N95.2 POSTMENOPAUSAL ATROPHIC VAGINITIS: Primary | ICD-10-CM

## 2025-07-15 DIAGNOSIS — N90.4 VULVAR DYSTROPHY: ICD-10-CM

## 2025-07-15 DIAGNOSIS — N39.3 FEMALE STRESS INCONTINENCE: ICD-10-CM

## 2025-07-15 LAB
BILIRUB UR QL STRIP.AUTO: NEGATIVE
CLARITY UR REFRACT.AUTO: CLEAR
CONTROL RUN WITHIN 24 HOURS?: YES
GLUCOSE UR STRIP.AUTO-MCNC: NORMAL MG/DL
KETONES UR STRIP.AUTO-MCNC: NEGATIVE MG/DL
LEUKOCYTE ESTERASE UR QL STRIP.AUTO: NEGATIVE
LEUKOCYTE ESTERASE URINE: NEGATIVE
NITRITE UR QL STRIP.AUTO: NEGATIVE
NITRITE URINE: NEGATIVE
PH UR STRIP.AUTO: 7 [PH] (ref 5–8)
PROT UR STRIP.AUTO-MCNC: NEGATIVE MG/DL
RBC UR QL AUTO: NEGATIVE
SP GR UR STRIP.AUTO: 1.01 (ref 1–1.03)
UROBILINOGEN UR STRIP.AUTO-MCNC: NORMAL MG/DL

## 2025-07-15 PROCEDURE — 87086 URINE CULTURE/COLONY COUNT: CPT | Performed by: OBSTETRICS & GYNECOLOGY

## 2025-07-15 PROCEDURE — 81003 URINALYSIS AUTO W/O SCOPE: CPT | Performed by: OBSTETRICS & GYNECOLOGY

## 2025-07-15 PROCEDURE — 81002 URINALYSIS NONAUTO W/O SCOPE: CPT | Performed by: OBSTETRICS & GYNECOLOGY

## 2025-07-15 PROCEDURE — 99212 OFFICE O/P EST SF 10 MIN: CPT

## 2025-07-15 RX ORDER — CLOBETASOL PROPIONATE 0.5 MG/G
1 OINTMENT TOPICAL 2 TIMES DAILY
Qty: 60 G | Refills: 3 | Status: SHIPPED | OUTPATIENT
Start: 2025-07-15

## 2025-07-15 RX ORDER — ESTRADIOL 0.1 MG/G
CREAM VAGINAL
Qty: 42 G | Refills: 3 | Status: SHIPPED | OUTPATIENT
Start: 2025-07-15

## 2025-07-15 NOTE — PROGRESS NOTES
Hortensia Sheriff,   7/15/2025     Referred by Dr. EZEKIEL Layne  Pt here with self    Chief Complaint   Patient presents with    Urinary Symptoms     Referred by Dr. Eileen Layne for microscopic hematuria, vulvar itching and burning       HPI:  Rare NIKOS  No UUI  Nocturia x0  No prolapse sx  No dyspareunia  irreg bowels    PRIOR TREATMENTS:    Kegels    no UTIs  No gross hematuria  Micro hematuria per PCP given dip (ua negative)      , Vd x2    She c/o vulvar irritation with itching  Some dryness    Rx'd vag estrogen w/o improvement    Vitals:  Temp 98 °F (36.7 °C)   Resp 18   Ht 66\"   Wt 182 lb 3.2 oz (82.6 kg)   LMP  (LMP Unknown)   BMI 29.41 kg/m²      HISTORY:  Past Medical History[1]   Past Surgical History[2]   Family History[3]   Short Social Hx on File[4]     Allergies:  Allergies[5]    Medications:  Medications Prior to Visit[6]    Urogynecology Summary:  Urogynecology Summary  Prolapse: No  NIKOS: Yes (rare-not bothersome)  Urge Incontinence: Yes (very rarely)  Nocturia Frequency: 1 (rare with fluid intake)  Frequency: 2 - 3 hours  Incomplete emptying: No  Constipation: Yes (sometimes)  Wears pad day?: 0  Wears Pad Night?: 0  Activities are limited by UI/POP?: No  Currently Sexually Active: Yes (rarely)    Review of Systems:    A comprehensive 12 point review of systems was completed.  Pertinent positives noted in the the HPI.  No CP  No SOB    GENERAL EXAM:  GENERAL:  Alert and Oriented, and NAD  HEENT:  Normal, no lesions  LUNGS:  Normal effort  HEART:  RRR  ABDOMEN: soft, no palpable mass  EXTREM:  Normal, no edema  SKIN:  Normal, no lesions    PELVIC EXAM:  Ext. Gen: +atrophy, no lesions, diffuse lichenification  Urethra: +atrophy, nontender  Bladder:some fullness, nontender  Vagina: +atrophy  Cervix: no bleeding, no lesions, nontender  Uterus: +mobile  Adnexa:no masses, nontender  Perineum: nontender  Anus: wnl  Rectum: defer    PELVIS FLOOR NEUROMUSCULAR FUNCTION:  Strength:  1 and Unable to hold greater  than 3 sec  Perineal Sensation:  Normal      PELVIC SUPPORT:  [place]:  0  Ant:  0  Post:  1  CST:  negative  UVJ: somewhat hypermobile    Pt examined with chaperone, RN    Impression/Plan:    ICD-10-CM    1. Postmenopausal atrophic vaginitis  N95.2 estradiol (ESTRACE) 0.1 MG/GM Vaginal Cream      2. Vulvar dystrophy  N90.4 clobetasol 0.05 % External Ointment      3. Female stress incontinence  N39.3 Urine Culture, Routine     Urinalysis, Routine     POCT urinalysis dipstick[[id number]]          Discussion Items:   Topical estrogen therapy for treating UGA  Discussed dietary and behavioral modification, discussed pharmacologic and nonpharmacologic mgmt options for urinary symptoms. Discussed dietary & weight management with potential improvements in symptoms with weight loss.    Discussed management options for vulvar dystrophy. Discussed chronic nature of symptoms. Discussed steroid treatments and vulvar care, discussed associated treatment risks and benefits.   Discussed need for future vulvar biopsy if sx persist despite mgmt as prescribed.     Discussed mgmt of vulvovaginal atrophy with vaginal estrogen cream. Reviewed associated benefits, risks, alternatives, and goals. Recommend low dose twice weekly mgmt       Diagnostic Items:  Urine testing    Medications Discussed:  Estrace Cream  clobetasol    Treatment Plan, Non-surgical:   RN teaching/pt education done  Estrace / Premarin cream  clobetasol    Treatment Plan, Surgical:   None    Pt verbalizes understanding of all above discussed information. All questions answered. She agrees to plan    Return in about 6 weeks (around 8/26/2025) for skin check.    Hortensia Sheriff, DO, FACOG, FACS      Discussion undertaken in English, info provided      The 21st Century Cures Act makes medical notes like these available to patients in the interest of transparency. However, be advised this is a medical document. It is intended as peer to peer communication. It is written in  medical language and may contain abbreviations or verbiage that are unfamiliar. It may appear blunt or direct. Medical documents are intended to carry relevant information, facts as evident, and the clinical opinion of the practitioner.   S       [1]   Past Medical History:   Abnormal finding on MRI of brain    Asthma (HCC)    Coronary artery disease    s/p DOTTY to SVG OM. EF 55%    Depression    Depression with anxiety    Disorder of thyroid    Gastroesophageal reflux disease without esophagitis    High blood pressure    History of COVID-19    symptoms: sinus infection; s/s resolved; not hospitalized    Hx of motion sickness    Years ago    Hypogammaglobulinemia (HCC)    Hypothyroidism, unspecified type    IBS (irritable bowel syndrome)    Mixed hyperlipidemia    EMELYN on CPAP    Osteoarthritis    Osteopenia    Pneumonia due to organism    PONV (postoperative nausea and vomiting)    Once many years ago    Sleep apnea    CPAP    Tinnitus    Visual impairment    glasses   [2]   Past Surgical History:  Procedure Laterality Date    Cabg  2014    Carpal tunnel release Right 2021    Cath drug eluting stent  2016    Colonoscopy,biopsy  09/11/2012    diverticulosis    Egd N/A 09/25/2017    Hernia surgery      Shoulder arthroscopy Left 2020    Thyroidectomy  2013    Tonsillectomy  2003    Upper gi endoscopy,biopsy  01/18/2012    Upper gi endoscopy,biopsy  09/11/2012   [3]   Family History  Problem Relation Age of Onset    Lipids Father     Dementia Father     Heart Attack Father 90    Lipids Mother     Stroke Mother 55    Dementia Mother     Heart Attack Brother 62   [4]   Social History  Socioeconomic History    Marital status:    Tobacco Use    Smoking status: Never    Smokeless tobacco: Never   Vaping Use    Vaping status: Never Used   Substance and Sexual Activity    Alcohol use: Yes     Alcohol/week: 0.0 - 5.0 standard drinks of alcohol     Comment: socially    Drug use: No    Sexual activity: Yes     Partners:  Male   Other Topics Concern    Caffeine Concern Yes    Exercise Yes     Social Drivers of Health     Food Insecurity: No Food Insecurity (10/8/2024)    Received from [facility] - Food Insecurity     Worried About Running Out of Food in the Last Year: No     Ran Out of Food in the Last Year: No   Transportation Needs: No Transportation Needs (10/8/2024)    Received from [facility] - Transportation     Lack of Transportation: No   Housing Stability: Not At Risk (10/8/2024)    Received from [facility] - Housing/Utilities     Has Housing: Yes     Worried About Losing Housing: No     Unable to Get Utilities: No   [5]   Allergies  Allergen Reactions    Biaxin [Clarithromycin] HIVES    Ceclor HIVES     The patient has tolerated amoxicilin and omnicef    Dander HIVES, Runny nose and ITCHING     Cats & dogs    Mold WHEEZING    Wheat Gluten UNKNOWN     Per allergy testing   [6]   Outpatient Medications Prior to Visit   Medication Sig Dispense Refill    amLODIPine 5 MG Oral Tab Take 1 tablet (5 mg total) by mouth daily.      clopidogrel 75 MG Oral Tab Take 1 tablet (75 mg total) by mouth daily.  0    atorvastatin 40 MG Oral Tab Take 1 tablet (40 mg total) by mouth nightly.      chlorpheniramine 4 MG Oral Tab Take 1 tablet (4 mg total) by mouth every 4 (four) hours as needed for Allergies.      losartan 50 MG Oral Tab Take 1 tablet (50 mg total) by mouth daily.      acidophilus-pectin Oral Cap Take 1 capsule by mouth daily.      EUTHYROX 75 MCG Oral Tab Take 1 tablet (75 mcg total) by mouth before breakfast. 90 tablet 1    PARoxetine 20 MG Oral Tab Take 1 tablet by mouth once daily 90 tablet 1    Sodium Chloride-Xylitol (XLEAR SINUS CARE SPRAY NA) by Nasal route 2 (two) times daily.      acetaminophen 500 MG Oral Tab Take 1 tablet (500 mg total) by mouth every 6 (six) hours as needed for Pain.      Multiple Vitamin (ONE-DAILY MULTI VITAMINS) Oral Tab Take 1 tablet by mouth  daily.      Cholecalciferol 100 MCG (4000 UT) Oral Cap Take 4,000 Units by mouth daily.        aspirin 81 MG Oral Tab Take 1 tablet (81 mg total) by mouth nightly. 30 tablet 0     No facility-administered medications prior to visit.

## 2025-07-29 RX ORDER — GABAPENTIN 100 MG/1
100 CAPSULE ORAL NIGHTLY
COMMUNITY
Start: 2025-05-22

## 2025-07-29 RX ORDER — CARVEDILOL 3.12 MG/1
3.12 TABLET ORAL 2 TIMES DAILY
COMMUNITY
Start: 2024-11-06

## 2025-07-29 RX ORDER — SALMETEROL XINAFOATE 50 MCG
1 BLISTER, WITH INHALATION DEVICE INHALATION 2 TIMES DAILY
COMMUNITY

## 2025-07-29 RX ORDER — LEVOCETIRIZINE DIHYDROCHLORIDE 5 MG/1
5 TABLET, FILM COATED ORAL EVERY EVENING
COMMUNITY

## 2025-07-29 RX ORDER — LEVOTHYROXINE SODIUM 75 UG/1
75 TABLET ORAL
COMMUNITY

## 2025-07-29 RX ORDER — ACETIC ACID 20.65 MG/ML
1 SOLUTION AURICULAR (OTIC) DAILY
COMMUNITY
Start: 2025-06-19 | End: 2025-11-01

## 2025-08-11 ENCOUNTER — HOSPITAL ENCOUNTER (OUTPATIENT)
Facility: HOSPITAL | Age: 75
Setting detail: HOSPITAL OUTPATIENT SURGERY
Discharge: HOME | End: 2025-08-11
Attending: INTERNAL MEDICINE | Admitting: INTERNAL MEDICINE

## 2025-08-11 ENCOUNTER — ANESTHESIA (OUTPATIENT)
Dept: ENDOSCOPY | Facility: HOSPITAL | Age: 75
End: 2025-08-11

## 2025-08-11 ENCOUNTER — ANESTHESIA EVENT (OUTPATIENT)
Dept: ENDOSCOPY | Facility: HOSPITAL | Age: 75
End: 2025-08-11

## 2025-08-11 VITALS
DIASTOLIC BLOOD PRESSURE: 76 MMHG | TEMPERATURE: 98 F | HEIGHT: 66 IN | HEART RATE: 49 BPM | BODY MASS INDEX: 29.25 KG/M2 | OXYGEN SATURATION: 100 % | SYSTOLIC BLOOD PRESSURE: 93 MMHG | WEIGHT: 182 LBS | RESPIRATION RATE: 16 BRPM

## 2025-08-11 RX ORDER — HYDROMORPHONE HYDROCHLORIDE 1 MG/ML
0.2 INJECTION, SOLUTION INTRAMUSCULAR; INTRAVENOUS; SUBCUTANEOUS EVERY 5 MIN PRN
Status: DISCONTINUED | OUTPATIENT
Start: 2025-08-11 | End: 2025-08-11

## 2025-08-11 RX ORDER — ONDANSETRON 2 MG/ML
4 INJECTION INTRAMUSCULAR; INTRAVENOUS EVERY 6 HOURS PRN
Status: DISCONTINUED | OUTPATIENT
Start: 2025-08-11 | End: 2025-08-11

## 2025-08-11 RX ORDER — METOCLOPRAMIDE HYDROCHLORIDE 5 MG/ML
10 INJECTION INTRAMUSCULAR; INTRAVENOUS EVERY 8 HOURS PRN
Status: DISCONTINUED | OUTPATIENT
Start: 2025-08-11 | End: 2025-08-11

## 2025-08-11 RX ORDER — HYDROMORPHONE HYDROCHLORIDE 1 MG/ML
0.4 INJECTION, SOLUTION INTRAMUSCULAR; INTRAVENOUS; SUBCUTANEOUS EVERY 5 MIN PRN
Status: DISCONTINUED | OUTPATIENT
Start: 2025-08-11 | End: 2025-08-11

## 2025-08-11 RX ORDER — HYDROMORPHONE HYDROCHLORIDE 1 MG/ML
0.6 INJECTION, SOLUTION INTRAMUSCULAR; INTRAVENOUS; SUBCUTANEOUS EVERY 5 MIN PRN
Status: DISCONTINUED | OUTPATIENT
Start: 2025-08-11 | End: 2025-08-11

## 2025-08-11 RX ORDER — SODIUM CHLORIDE, SODIUM LACTATE, POTASSIUM CHLORIDE, CALCIUM CHLORIDE 600; 310; 30; 20 MG/100ML; MG/100ML; MG/100ML; MG/100ML
INJECTION, SOLUTION INTRAVENOUS CONTINUOUS
Status: DISCONTINUED | OUTPATIENT
Start: 2025-08-11 | End: 2025-08-11

## 2025-08-11 RX ORDER — LIDOCAINE HYDROCHLORIDE 10 MG/ML
INJECTION, SOLUTION EPIDURAL; INFILTRATION; INTRACAUDAL; PERINEURAL AS NEEDED
Status: DISCONTINUED | OUTPATIENT
Start: 2025-08-11 | End: 2025-08-11 | Stop reason: SURG

## 2025-08-11 RX ORDER — NALOXONE HYDROCHLORIDE 0.4 MG/ML
0.08 INJECTION, SOLUTION INTRAMUSCULAR; INTRAVENOUS; SUBCUTANEOUS AS NEEDED
Status: DISCONTINUED | OUTPATIENT
Start: 2025-08-11 | End: 2025-08-11

## 2025-08-11 RX ADMIN — SODIUM CHLORIDE, SODIUM LACTATE, POTASSIUM CHLORIDE, CALCIUM CHLORIDE: 600; 310; 30; 20 INJECTION, SOLUTION INTRAVENOUS at 09:25:00

## 2025-08-11 RX ADMIN — SODIUM CHLORIDE, SODIUM LACTATE, POTASSIUM CHLORIDE, CALCIUM CHLORIDE: 600; 310; 30; 20 INJECTION, SOLUTION INTRAVENOUS at 09:54:00

## 2025-08-11 RX ADMIN — LIDOCAINE HYDROCHLORIDE 25 MG: 10 INJECTION, SOLUTION EPIDURAL; INFILTRATION; INTRACAUDAL; PERINEURAL at 09:26:00

## 2025-08-26 ENCOUNTER — OFFICE VISIT (OUTPATIENT)
Dept: UROLOGY | Facility: CLINIC | Age: 75
End: 2025-08-26
Attending: OBSTETRICS & GYNECOLOGY

## 2025-08-26 VITALS — BODY MASS INDEX: 29.25 KG/M2 | TEMPERATURE: 98 F | HEIGHT: 66 IN | WEIGHT: 182 LBS

## 2025-08-26 DIAGNOSIS — N90.4 VULVAR DYSTROPHY: Primary | ICD-10-CM

## 2025-08-26 DIAGNOSIS — N95.2 POSTMENOPAUSAL ATROPHIC VAGINITIS: ICD-10-CM

## 2025-08-26 PROCEDURE — 99212 OFFICE O/P EST SF 10 MIN: CPT

## 2025-08-26 RX ORDER — AZITHROMYCIN 250 MG/1
TABLET, FILM COATED ORAL
COMMUNITY
Start: 2025-08-21

## 2025-08-26 RX ORDER — AZELASTINE 1 MG/ML
SPRAY, METERED NASAL 2 TIMES DAILY
COMMUNITY

## (undated) DEVICE — DERMABOND CLOSURE 0.7ML TOPICL

## (undated) DEVICE — 40580 - THE PINK PAD - ADVANCED TRENDELENBURG POSITIONING KIT: Brand: 40580 - THE PINK PAD - ADVANCED TRENDELENBURG POSITIONING KIT

## (undated) DEVICE — MONOPOLAR CURVED SCISSORS: Brand: ENDOWRIST

## (undated) DEVICE — Device: Brand: DEFENDO AIR/WATER/SUCTION AND BIOPSY VALVE

## (undated) DEVICE — CANNULA SEAL

## (undated) DEVICE — FENESTRATED BIPOLAR FORCEPS: Brand: ENDOWRIST

## (undated) DEVICE — KIT VLV 5 PC AIR H2O SUCT BX ENDOGATOR CONN

## (undated) DEVICE — FORCEP RADIAL JAW 4

## (undated) DEVICE — ARM DRAPE

## (undated) DEVICE — DISPOSABLE GRASPER: Brand: EPIX LAPAROSCOPIC GRASPER

## (undated) DEVICE — SOL  0.9% 1000ML

## (undated) DEVICE — TIP COVER ACCESSORY

## (undated) DEVICE — STERILE SYNTHETIC POLYISOPRENE POWDER-FREE SURGICAL GLOVES WITH HYDROGEL COATING, SMOOTH FINISH, STRAIGHT FINGER: Brand: PROTEXIS

## (undated) DEVICE — TIP-UP FENESTRATED GRASPER: Brand: ENDOWRIST

## (undated) DEVICE — GLOVE SURG SENSICARE SZ 7

## (undated) DEVICE — LIGHT HANDLE

## (undated) DEVICE — SUT ETHIBOND 0 SH X524H

## (undated) DEVICE — 2, DISPOSABLE SUCTION/IRRIGATOR WITHOUT DISPOSABLE TIP: Brand: STRYKEFLOW

## (undated) DEVICE — BARD® PTFE FELT PLEDGETS, (OVAL), 4.8 MM X 6 MM: Brand: BARD® PTFE FELT PLEDGETS

## (undated) DEVICE — ENDOSCOPY PACK - LOWER: Brand: MEDLINE INDUSTRIES, INC.

## (undated) DEVICE — 3M™ RED DOT™ MONITORING ELECTRODE WITH FOAM TAPE AND STICKY GEL, 50/BAG, 20/CASE, 72/PLT 2570: Brand: RED DOT™

## (undated) DEVICE — TRAY SURESTEP 16 BARDEX UMETR

## (undated) DEVICE — LAPAROVUE VISIBILITY SYSTEM LAPAROSCOPIC SOLUTIONS: Brand: LAPAROVUE

## (undated) DEVICE — 1200CC GUARDIAN II: Brand: GUARDIAN

## (undated) DEVICE — FILTERLINE NASAL ADULT O2/CO2

## (undated) DEVICE — KIT CUSTOM ENDOPROCEDURE STERIS

## (undated) DEVICE — GLOVE SUR 7 SENSICARE PIP WHT PWD F

## (undated) DEVICE — CAP ENDO DISTAL ATTCHMNT 11.35

## (undated) DEVICE — INSUFFLATION NEEDLE TO ESTABLISH PNEUMOPERITONEUM.: Brand: INSUFFLATION NEEDLE

## (undated) DEVICE — STANDARD HYPODERMIC NEEDLE,POLYPROPYLENE HUB: Brand: MONOJECT

## (undated) DEVICE — CANNULA NSL AD W/ FLTR 14FT O2/CO2

## (undated) DEVICE — STRL PENROSE DRAIN 18" X 1/4": Brand: CARDINAL HEALTH

## (undated) DEVICE — KIT MFLD FOR SPEC COLL

## (undated) DEVICE — TRI-LUMEN FILTERED TUBE SET WITH ACTIVATED CHARCOAL FILTER: Brand: AIRSEAL

## (undated) DEVICE — COLUMN DRAPE

## (undated) DEVICE — MEGA SUTURECUT ND: Brand: ENDOWRIST

## (undated) DEVICE — SUT MONOCRYL 4-0 PS-2 Y496G

## (undated) DEVICE — ELECTRODE EKG W3.5XL4CM ABRAD W1.25XL1.5IN

## (undated) DEVICE — BLADELESS OBTURATOR: Brand: WECK VISTA

## (undated) DEVICE — JELLY,LUBE,STERILE,FLIP TOP,TUBE,2-OZ: Brand: MEDLINE

## (undated) DEVICE — ROBOTIC GENERAL: Brand: MEDLINE INDUSTRIES, INC.

## (undated) DEVICE — CANISTER SUCT 1200CC LID BLU HRD ADPT AUTO

## (undated) DEVICE — AIRSEAL 5 MM ACCESS PORT AND LOW PROFILE OBTURATOR WITH BLADELESS OPTICAL TIP, 120 MM LENGTH: Brand: AIRSEAL

## (undated) NOTE — ED AVS SNAPSHOT
Helena Jones   MRN: [id number]    Department:  [facility] Emergency Department   Date of Visit:  2/9/2018           Disclosure     Insurance plans vary and the physician(s) referred by the ER may not be covered by your plan.  Please contact you tell this physician (or your personal doctor if your instructions are to return to your personal doctor) about any new or lasting problems. The primary care or specialist physician will see patients referred from the [facility] Emergency Department.  Courtney Mcintosh

## (undated) NOTE — H&P (VIEW-ONLY)
Formatting of this note is different from the original.  Manjinder Mota is a 67year old female. Patient presents with: Follow - Up: Hernia       No ref. provider found    HPI:  Patient continued to have persistent symptoms of gastroesophageal reflux disease with heartburn-like symptoms and persistent coughing. She went through an evaluation for a TIF procedure but the insurance would not guarantee payment. She wishes to proceed with a fundoplication. Her work-up has been extensive and we again discussed it in detail. I do lengthy discussion in regards to her care with Dr Daniel Xie. Past Medical History:   Diagnosis Date    Abnormal finding on MRI of brain     Coronary artery disease     s/p DOTTY to SVG OM.  EF 55%    Depression with anxiety     Gastroesophageal reflux disease without esophagitis     Hiatal hernia     High blood pressure     History of Clostridioides difficile infection     20 years ago    History of MRSA infection 2014    Hypogammaglobulinemia ([address]     Hypothyroidism, unspecified type     IBS (irritable bowel syndrome)     Mixed hyperlipidemia     EMELYN on CPAP 2012    Osteopenia      Past Surgical History:   Procedure Laterality Date    ARTHROSCOPY OF JOINT UNLISTED      CABG  2014    CARPAL TUNNEL RELEASE Right 2021    CATH DRUG ELUTING STENT  2016    COLONOSCOPY      COLONOSCOPY N/A 08/29/2022    SSA- repeat 3 yrs    COLONOSCOPY,BIOPSY  09/11/2012    diverticulosis    EGD N/A 09/25/2017    SHOULDER ARTHROSCOPY Left 2020    SINUS SURGERY        THYROIDECTOMY  2013    TONSILLECTOMY  2003    UPPER GI ENDOSCOPY,BIOPSY  01/18/2012    UPPER GI ENDOSCOPY,BIOPSY  09/11/2012    UPPER GI ENDOSCOPY,EXAM       Family History   Problem Relation Age of Onset    Lipids Father     Dementia Father     Heart Attack Father 80    Lipids Mother     Stroke Mother 54    Dementia Mother     Heart Attack Brother 58     Social History    Tobacco Use      Smoking status: Never      Smokeless tobacco: Never Vaping Use      Vaping Use: Never used    Alcohol use: Yes      Alcohol/week: 0.0 - 5.0 standard drinks      Comment: socially    Drug use: No    Allergies:    Biaxin [Clarithromy*    HIVES  Ceclor                  HIVES    Comment:The patient has tolerated amoxicilin and omnicef  Dander                  HIVES, Runny nose, ITCHING    Comment:Cats & dogs  Mold                    WHEEZING  Current Meds:  Current Outpatient Medications   Medication Sig Dispense Refill    clopidogrel 75 MG Oral Tab Take 1 tablet (75 mg total) by mouth daily. 90 tablet 2    atorvastatin 40 MG Oral Tab Take 1 tablet (40 mg total) by mouth nightly. 90 tablet 3    pantoprazole 40 MG Oral Tab EC Take 1 tablet (40 mg total) by mouth 2 (two) times daily before meals. (Patient taking differently: Take 40 mg by mouth every morning before breakfast.) 60 tablet 2    chlorpheniramine 4 MG Oral Tab Take 4 mg by mouth as needed for Allergies. Calcium Carb-Cholecalciferol (CALCIUM 1000 + D OR) Take by mouth. hydrocortisone 2.5 % External Cream Place 1 Application rectally 2 (two) times daily. 1 each 0    losartan 50 MG Oral Tab Take by mouth daily. metoprolol tartrate 25 MG Oral Tab Take 0.5 tablets (12.5 mg total) by mouth 2 (two) times daily. 90 tablet 3    PARoxetine 20 MG Oral Tab TAKE 1 TABLET BY MOUTH EVERY DAY 90 tablet 1    ipratropium 0.06 % Nasal Solution 1-2 sprays each nostril twice daily initially, may use up to 2 sprays each nostril every 8 hours as needed. 3 each 1    levocetirizine 5 MG Oral Tab Take 5 mg by mouth every evening. fluticasone propionate 50 MCG/ACT Nasal Suspension SPRAY 1 SPRAY INTO EACH NOSTRIL TWICE A DAY 48 mL 5    albuterol (PROAIR HFA) 108 (90 Base) MCG/ACT Inhalation Aero Soln Inhale 2 puffs into the lungs every 4 (four) hours as needed.  2 puffs Q 4- 6 hours PRN 1 each 0    EUTHYROX 75 MCG Oral Tab Take 1 tablet (75 mcg total) by mouth before breakfast. 90 tablet 3    Sodium Chloride-Xylitol Lucia Marie SINUS CARE SPRAY NA) by Nasal route 2 (two) times daily. acetaminophen 500 MG Oral Tab Take 500 mg by mouth every 6 (six) hours as needed for Pain.      magnesium 250 MG Oral Tab Take 500 mg by mouth. Multiple Vitamin (ONE-DAILY MULTI VITAMINS) Oral Tab Take 1 tablet by mouth daily. Cholecalciferol 100 MCG (4000 UT) Oral Cap Take 4,000 Units by mouth daily. aspirin 81 MG Oral Tab Take 81 mg by mouth nightly. 30 tablet 0     ROS:  A comprehensive 14 point review of systems was completed. Pertinent positives and negatives noted in the the HPI. EXAM:  GENERAL: well developed, well nourished, in no apparent distress  SKIN: no rashes, no suspicious lesions  EYES: PERRLA, EOMI, sclera anicteric, conjunctiva normal; fundi normal, there is no nystagmus  HEENT: normocephalic; normal nose, pharynx and TM's  NECK: supple; FROM; no JVD, no TMG, no carotid bruits  BREASTS: deferred  RESPIRATORY: clear to percussion and auscultation  CARDIOVASCULAR: S1, S2 normal, RRR; no S3, no S4; no click; murmur negative  ABDOMEN: normal active BS+, soft, nondistended; no HSM; no masses; no bruits; nontender  RECTAL: deferred  LYMPHATIC: no lymphadenopathy  MUSCULOSKELETAL: no acute synovitis upper or lower extremity, no spinal tenderness  EXTREMITIES: no cyanosis, clubbing or edema, peripheral pulses intact  NEUROLOGIC: intact; no sensorimotor deficit; reflexes normal  PSYCHIATRIC: alert and oriented x 3; affect appropriate    IMPRESSION:  Hiatal hernia with persistent gastroesophageal reflux disease despite medication    PLAN:  Robotic assisted repair of this hiatal hernia with Nissen fundoplication. The rationale risk benefits and alternatives of this approach were explained to her in detail. All of her questions were answered. She will need to be off her Plavix perioperatively. Cardiac clearance.   Her surgery be scheduled in the near future at [facility]    Thank you very much for your kind referrals    Hannah Workman MD    Electronically signed by Yovani Verduzco MD at 04/20/2023  2:42 PM CDT

## (undated) NOTE — LETTER
[facility] [address], [address]  Consent for Procedure/Sedation    Date:        Time:       1.  I authorize the performance upon Freddy Griffith the following:cardiac catheterization, left ventricular cineangiography, bilateral period, the physician will determine when the applicable recovery period ends for purposes of reinstating the Do Not Resuscitate (DNR) order.     Signature of Patient: ____________________________________________________    Signature of person authorized